# Patient Record
Sex: MALE | Race: BLACK OR AFRICAN AMERICAN | NOT HISPANIC OR LATINO | Employment: OTHER | URBAN - METROPOLITAN AREA
[De-identification: names, ages, dates, MRNs, and addresses within clinical notes are randomized per-mention and may not be internally consistent; named-entity substitution may affect disease eponyms.]

---

## 2017-06-19 ENCOUNTER — TELEPHONE (OUTPATIENT)
Dept: TRANSPLANT | Facility: CLINIC | Age: 72
End: 2017-06-19

## 2017-06-26 DIAGNOSIS — Z76.82 ORGAN TRANSPLANT CANDIDATE: Primary | ICD-10-CM

## 2017-07-12 ENCOUNTER — CLINICAL SUPPORT (OUTPATIENT)
Dept: INFECTIOUS DISEASES | Facility: CLINIC | Age: 72
End: 2017-07-12
Payer: MEDICARE

## 2017-07-12 ENCOUNTER — HOSPITAL ENCOUNTER (OUTPATIENT)
Dept: RADIOLOGY | Facility: HOSPITAL | Age: 72
Discharge: HOME | End: 2017-07-12
Attending: NURSE PRACTITIONER
Payer: MEDICARE

## 2017-07-12 ENCOUNTER — OFFICE VISIT (OUTPATIENT)
Dept: CARDIOLOGY | Facility: CLINIC | Age: 72
End: 2017-07-12
Payer: MEDICARE

## 2017-07-12 ENCOUNTER — LAB VISIT (OUTPATIENT)
Dept: LAB | Facility: HOSPITAL | Age: 72
End: 2017-07-12
Payer: MEDICARE

## 2017-07-12 ENCOUNTER — OFFICE VISIT (OUTPATIENT)
Dept: TRANSPLANT | Facility: CLINIC | Age: 72
End: 2017-07-12
Payer: MEDICARE

## 2017-07-12 VITALS
HEIGHT: 69 IN | SYSTOLIC BLOOD PRESSURE: 159 MMHG | BODY MASS INDEX: 26.38 KG/M2 | HEART RATE: 89 BPM | DIASTOLIC BLOOD PRESSURE: 69 MMHG | WEIGHT: 178.13 LBS

## 2017-07-12 VITALS
BODY MASS INDEX: 26.73 KG/M2 | RESPIRATION RATE: 17 BRPM | SYSTOLIC BLOOD PRESSURE: 146 MMHG | HEIGHT: 68 IN | DIASTOLIC BLOOD PRESSURE: 68 MMHG | HEART RATE: 100 BPM | WEIGHT: 176.38 LBS | TEMPERATURE: 98 F | OXYGEN SATURATION: 98 %

## 2017-07-12 DIAGNOSIS — E78.5 HYPERLIPIDEMIA, UNSPECIFIED HYPERLIPIDEMIA TYPE: ICD-10-CM

## 2017-07-12 DIAGNOSIS — I10 ESSENTIAL HYPERTENSION: ICD-10-CM

## 2017-07-12 DIAGNOSIS — C61 PROSTATE CANCER: ICD-10-CM

## 2017-07-12 DIAGNOSIS — Z76.82 ORGAN TRANSPLANT CANDIDATE: ICD-10-CM

## 2017-07-12 DIAGNOSIS — Z01.818 PRE-TRANSPLANT EVALUATION FOR CHRONIC KIDNEY DISEASE: Primary | ICD-10-CM

## 2017-07-12 DIAGNOSIS — I12.0 HYPERTENSIVE NEPHROSCLEROSIS, STAGE 5 CHRONIC KIDNEY DISEASE OR END STAGE RENAL DISEASE: ICD-10-CM

## 2017-07-12 DIAGNOSIS — Z01.810 PREOPERATIVE CARDIOVASCULAR EXAMINATION: Primary | ICD-10-CM

## 2017-07-12 DIAGNOSIS — E87.20 ACIDOSIS: ICD-10-CM

## 2017-07-12 DIAGNOSIS — N18.4 CKD (CHRONIC KIDNEY DISEASE) STAGE 4, GFR 15-29 ML/MIN: ICD-10-CM

## 2017-07-12 DIAGNOSIS — Z76.82 PRE-KIDNEY TRANSPLANT, PATIENT ON TRANSPLANT LIST: ICD-10-CM

## 2017-07-12 DIAGNOSIS — Z76.82 PRE-KIDNEY TRANSPLANT, PATIENT ON TRANSPLANT LIST: Primary | ICD-10-CM

## 2017-07-12 PROBLEM — I12.9 HYPERTENSIVE NEPHROSCLEROSIS: Status: ACTIVE | Noted: 2017-07-12

## 2017-07-12 LAB
A1 AG RBC QL: NORMAL
ABO + RH BLD: NORMAL
ALBUMIN SERPL BCP-MCNC: 3.8 G/DL
ALP SERPL-CCNC: 96 U/L
ALT SERPL W/O P-5'-P-CCNC: 11 U/L
ANION GAP SERPL CALC-SCNC: 8 MMOL/L
APTT BLDCRRT: 21.4 SEC
AST SERPL-CCNC: 15 U/L
BASOPHILS # BLD AUTO: 0.01 K/UL
BASOPHILS NFR BLD: 0.2 %
BILIRUB DIRECT SERPL-MCNC: 0.2 MG/DL
BILIRUB SERPL-MCNC: 0.5 MG/DL
BLD GP AB SCN CELLS X3 SERPL QL: NORMAL
BUN SERPL-MCNC: 36 MG/DL
CALCIUM SERPL-MCNC: 9.3 MG/DL
CHLORIDE SERPL-SCNC: 113 MMOL/L
CHOLEST/HDLC SERPL: 6.8 {RATIO}
CO2 SERPL-SCNC: 22 MMOL/L
COMPLEXED PSA SERPL-MCNC: 0.03 NG/ML
CREAT SERPL-MCNC: 3.8 MG/DL
DIFFERENTIAL METHOD: ABNORMAL
EOSINOPHIL # BLD AUTO: 0.2 K/UL
EOSINOPHIL NFR BLD: 2.9 %
ERYTHROCYTE [DISTWIDTH] IN BLOOD BY AUTOMATED COUNT: 14.5 %
EST. GFR  (AFRICAN AMERICAN): 17.2 ML/MIN/1.73 M^2
EST. GFR  (NON AFRICAN AMERICAN): 14.9 ML/MIN/1.73 M^2
GLUCOSE SERPL-MCNC: 112 MG/DL
HBV CORE AB SERPL QL IA: NEGATIVE
HBV SURFACE AG SERPL QL IA: NEGATIVE
HCT VFR BLD AUTO: 32.6 %
HCV AB SERPL QL IA: NEGATIVE
HDL/CHOLESTEROL RATIO: 14.7 %
HDLC SERPL-MCNC: 259 MG/DL
HDLC SERPL-MCNC: 38 MG/DL
HGB BLD-MCNC: 10.4 G/DL
HIV 1+2 AB+HIV1 P24 AG SERPL QL IA: NEGATIVE
INR PPP: 1
LDLC SERPL CALC-MCNC: 192.4 MG/DL
LYMPHOCYTES # BLD AUTO: 2.6 K/UL
LYMPHOCYTES NFR BLD: 42.8 %
MCH RBC QN AUTO: 26.6 PG
MCHC RBC AUTO-ENTMCNC: 31.9 %
MCV RBC AUTO: 83 FL
MONOCYTES # BLD AUTO: 0.4 K/UL
MONOCYTES NFR BLD: 7 %
NEUTROPHILS # BLD AUTO: 2.8 K/UL
NEUTROPHILS NFR BLD: 46.9 %
NONHDLC SERPL-MCNC: 221 MG/DL
PHOSPHATE SERPL-MCNC: 3 MG/DL
PLATELET # BLD AUTO: 213 K/UL
PMV BLD AUTO: 10.7 FL
POTASSIUM SERPL-SCNC: 4.9 MMOL/L
PROT SERPL-MCNC: 7.4 G/DL
PROTHROMBIN TIME: 10.4 SEC
RBC # BLD AUTO: 3.91 M/UL
RPR SER QL: NORMAL
SODIUM SERPL-SCNC: 143 MMOL/L
TRIGL SERPL-MCNC: 143 MG/DL
WBC # BLD AUTO: 5.96 K/UL

## 2017-07-12 PROCEDURE — 99999 PR PBB SHADOW E&M-EST. PATIENT-LVL III: CPT | Mod: PBBFAC,TXP,, | Performed by: INTERNAL MEDICINE

## 2017-07-12 PROCEDURE — 90472 IMMUNIZATION ADMIN EACH ADD: CPT | Mod: PBBFAC,TXP

## 2017-07-12 PROCEDURE — 90472 IMMUNIZATION ADMIN EACH ADD: CPT | Mod: PBBFAC

## 2017-07-12 PROCEDURE — G0009 ADMIN PNEUMOCOCCAL VACCINE: HCPCS | Mod: PBBFAC,TXP

## 2017-07-12 PROCEDURE — 1159F MED LIST DOCD IN RCRD: CPT | Mod: TXP,,, | Performed by: INTERNAL MEDICINE

## 2017-07-12 PROCEDURE — 81373 HLA I TYPING 1 LOCUS LR: CPT | Mod: 91,PO,TXP

## 2017-07-12 PROCEDURE — 86825 HLA X-MATH NON-CYTOTOXIC: CPT | Mod: PO,TXP

## 2017-07-12 PROCEDURE — 76770 US EXAM ABDO BACK WALL COMP: CPT | Mod: 26,GC,TXP, | Performed by: RADIOLOGY

## 2017-07-12 PROCEDURE — 90670 PCV13 VACCINE IM: CPT | Mod: PBBFAC,TXP

## 2017-07-12 PROCEDURE — 1126F AMNT PAIN NOTED NONE PRSNT: CPT | Mod: TXP,,, | Performed by: INTERNAL MEDICINE

## 2017-07-12 PROCEDURE — 72170 X-RAY EXAM OF PELVIS: CPT | Mod: TC,TXP

## 2017-07-12 PROCEDURE — 93010 ELECTROCARDIOGRAM REPORT: CPT | Mod: S$PBB,TXP,, | Performed by: INTERNAL MEDICINE

## 2017-07-12 PROCEDURE — 71020 XR CHEST PA AND LATERAL: CPT | Mod: TC,TXP

## 2017-07-12 PROCEDURE — 81376 HLA II TYPING 1 LOCUS LR: CPT | Mod: PO,TXP

## 2017-07-12 PROCEDURE — 97802 MEDICAL NUTRITION INDIV IN: CPT | Mod: PBBFAC,TXP | Performed by: DIETITIAN, REGISTERED

## 2017-07-12 PROCEDURE — 76770 US EXAM ABDO BACK WALL COMP: CPT | Mod: TC,TXP

## 2017-07-12 PROCEDURE — 71020 XR CHEST PA AND LATERAL: CPT | Mod: 26,TXP,, | Performed by: RADIOLOGY

## 2017-07-12 PROCEDURE — 86829 HLA CLASS I/II ANTIBODY QUAL: CPT | Mod: PO,TXP

## 2017-07-12 PROCEDURE — 81373 HLA I TYPING 1 LOCUS LR: CPT | Mod: PO,TXP

## 2017-07-12 PROCEDURE — 99214 OFFICE O/P EST MOD 30 MIN: CPT | Mod: PBBFAC,25,TXP | Performed by: INTERNAL MEDICINE

## 2017-07-12 PROCEDURE — 99205 OFFICE O/P NEW HI 60 MIN: CPT | Mod: S$PBB,TXP,, | Performed by: INTERNAL MEDICINE

## 2017-07-12 PROCEDURE — 99204 OFFICE O/P NEW MOD 45 MIN: CPT | Mod: S$PBB,TXP,, | Performed by: PHYSICIAN ASSISTANT

## 2017-07-12 PROCEDURE — 81376 HLA II TYPING 1 LOCUS LR: CPT | Mod: 91,PO,TXP

## 2017-07-12 PROCEDURE — 93005 ELECTROCARDIOGRAM TRACING: CPT | Mod: PBBFAC,TXP | Performed by: INTERNAL MEDICINE

## 2017-07-12 PROCEDURE — 72170 X-RAY EXAM OF PELVIS: CPT | Mod: 26,TXP,, | Performed by: RADIOLOGY

## 2017-07-12 PROCEDURE — 99999 PR PBB SHADOW E&M-EST. PATIENT-LVL I: CPT | Mod: PBBFAC,TXP,,

## 2017-07-12 PROCEDURE — 99999 PR PBB SHADOW E&M-EST. PATIENT-LVL IV: CPT | Mod: PBBFAC,TXP,, | Performed by: INTERNAL MEDICINE

## 2017-07-12 PROCEDURE — 90636 HEP A/HEP B VACC ADULT IM: CPT | Mod: PBBFAC,TXP

## 2017-07-12 PROCEDURE — 99204 OFFICE O/P NEW MOD 45 MIN: CPT | Mod: S$PBB,TXP,, | Performed by: TRANSPLANT SURGERY

## 2017-07-12 PROCEDURE — 86825 HLA X-MATH NON-CYTOTOXIC: CPT | Mod: 91,PO,TXP

## 2017-07-12 PROCEDURE — 86828 HLA CLASS I&II ANTIBODY QUAL: CPT | Mod: 91,PO,TXP

## 2017-07-12 PROCEDURE — 99204 OFFICE O/P NEW MOD 45 MIN: CPT | Mod: S$PBB,TXP,, | Performed by: INTERNAL MEDICINE

## 2017-07-12 RX ORDER — AMLODIPINE BESYLATE 10 MG/1
10 TABLET ORAL DAILY
Refills: 0 | Status: ON HOLD | COMMUNITY
Start: 2017-04-14 | End: 2019-04-26 | Stop reason: HOSPADM

## 2017-07-12 RX ORDER — SODIUM BICARBONATE 650 MG/1
1300 TABLET ORAL 3 TIMES DAILY
Refills: 0 | COMMUNITY
Start: 2017-06-01

## 2017-07-12 RX ORDER — DOXAZOSIN 4 MG/1
2 TABLET ORAL DAILY
Refills: 0 | Status: ON HOLD | COMMUNITY
Start: 2017-06-28 | End: 2019-06-14

## 2017-07-12 RX ORDER — SIMVASTATIN 40 MG/1
40 TABLET, FILM COATED ORAL NIGHTLY
Status: ON HOLD | COMMUNITY
End: 2019-04-26 | Stop reason: HOSPADM

## 2017-07-12 RX ORDER — GEMFIBROZIL 600 MG/1
600 TABLET, FILM COATED ORAL
Status: ON HOLD | COMMUNITY
End: 2019-06-14

## 2017-07-12 NOTE — LETTER
July 12, 2017      David Zambrano MD  [address]  [facility] [address]           [facility]  [address]  [facility] [address]  Phone: [phone]          Patient: Flakito Smart   MR Number: [id number]   YOB: 1945   Date of Visit: 7/12/2017       Dear Dr. Daivd Zambrano:    Thank you for referring Flakito Smart to me for evaluation. Attached you will find relevant portions of my assessment and plan of care.    If you have questions, please do not hesitate to call me. I look forward to following Flakito Smart along with you.    Sincerely,    Mihai Starr MD    Enclosure  CC:  No Recipients    If you would like to receive this communication electronically, please contact [email or web address] or [phone] to request more information on [employer] access.    For providers and/or their staff who would like to refer a patient to [facility], please contact us through our one-stop-shop provider referral line, [facility], at [phone].    If you feel you have received this communication in error or would no longer like to receive these types of communications, please e-mail [email or web address]

## 2017-07-12 NOTE — PROGRESS NOTES
Transplant Nephrology  Kidney Transplant Recipient Evaluation    Referring Physician: Dorian Mccartney  Current Nephrologist: Dorian Mccartney    Subjective:   CC:  Initial evaluation of kidney transplant candidacy.    HPI:  Mr. Smart is a 71 y.o. year old Black or  male who has presented to be evaluated as a potential kidney transplant recipient.  He has advanced kidney disease secondary to HTN.  Patient is currently pre-dialysis. He has a no dialysis access for dialysis access.     Patient has CKD stage 4 due biopsy proven hypertensive nephrosclerosis (as per records). No DM no h/o CAD PVD, Strokes, No infections. He is predialysis. He goes fishing once a week. No cane walker or 02 at home. Able to walk up to 6 blocks. Denied any chest pain or SOB. Quit smoking in 1970. Currently retired. No recent admissions to the hospital. Lives in the [facility] and Dr. JERICA Mccartney is his nephrologist.     No living donors but will speak with his friends and family members.    Great  Appetite. He lives with the [facility]. Patient does his own cooking and cleaning. Very well groomed. He is is here with her neighbor and his sister (who is not here will be the care giver)          Previous Transplant: no    Past Medical and Surgical History: Mr. Smart  has a past medical history of Acidosis; CKD (chronic kidney disease) stage 4, GFR 15-29 ml/min; Essential hypertension; Hypertensive nephrosclerosis (biopsy proven as per records); and Prostate cancer diagnised in 1994.  He has a past surgical history that includes Renal biopsy and ostatectomy (1994).    Past Social and Family History: Mr. Smart reports that he quit smoking about 47 years ago. He started smoking about 55 years ago. He has a 16.00 pack-year smoking history. He has never used smokeless tobacco. He reports that he does not drink alcohol or use drugs. His family history includes Blindness in his brother; Cancer in his mother; Heart disease in his  "father and mother; Kidney disease in his sister.    Review of Systems     Skin: no skin rash  CNS; no headaches, blurred vision, seizure, or syncope  ENT: No JVD,  Adenopathies,  nasal congestion. No oral lesions  Cardiac: No chest pain, dyspnea, claudication, edema or palpitations  Respiratory: No SOB, cough, hemoptysis   Gastro-intestinal: No diarrhea, constipation, abdominal pain, nausea, vomit. No ascitis  Genitourinary: no hematuria, dysuria, frequency, frequency  Musculoskeletal: joint pain, arthritis or vasculitic changes  Psych: alert awake, oriented, No cranial nerves deficit.      Objective:   Blood pressure (!) 146/68, pulse 100, temperature 97.9 °F (36.6 °C), temperature source Oral, resp. rate 17, height 5' 7.91" (1.725 m), weight 80 kg (176 lb 5.9 oz), SpO2 98 %.body mass index is 26.89 kg/m².    Physical Exam     Low frailty index    Very acceptable functional capacity    Head: normocephalic  Neck: No JVD, cervical axillary, or femoral adenopathies  Heart: no murmurs, Normal s1 and s2, No gallops, no rubs, No murmurs  Lungs; CTA, good respiratory effort, no crackles  Abdomen: soft, non tender, no splenomegaly or hepatomegaly, no massess, no bruits  Extremities: No edema, skin rash, joint pain  SNC: awake, alert oriented. Cranial nerves are intact, no focalized, sensitivity and strength preserved      Labs:  Lab Results   Component Value Date    WBC 5.96 07/12/2017    HGB 10.4 (L) 07/12/2017    HCT 32.6 (L) 07/12/2017     07/12/2017    K 4.9 07/12/2017     (H) 07/12/2017    CO2 22 (L) 07/12/2017    BUN 36 (H) 07/12/2017    CREATININE 3.8 (H) 07/12/2017    EGFRNONAA 14.9 (A) 07/12/2017    CALCIUM 9.3 07/12/2017    PHOS 3.0 07/12/2017    ALBUMIN 3.8 07/12/2017    AST 15 07/12/2017    ALT 11 07/12/2017       No results found for: PREALBUMIN, BILIRUBINUA, GGT, AMYLASE, LIPASE, PROTEINUA, NITRITE, RBCUA, WBCUA    No results found for: HLAABCTYPE    Labs were reviewed with the " patient.    Assessment:     1. Organ transplant candidate    2. Pre-transplant evaluation for chronic kidney disease    3. CKD (chronic kidney disease) stage 4, GFR 15-29 ml/min    4. Essential hypertension    5. Prostate cancer diagnised in 1994    6. Acidosis    7. Hypertensive nephrosclerosis, stage 5 chronic kidney disease or end stage renal disease        Plan:     Transplant Candidacy:   Based on available information, Mr. Smart is a high-risk kidney transplant candidate.  Final determination of transplant candidacy will be made once workup is complete and reviewed by the selection committee.      I advised living donation specially due to his age. Despite his age He does not have any evidence of c/v disease, no claudication good functional capacity. He is acceptable candidate however I explained that his expected waiting time could be a barrier for kidney transplantation and this will be discussed with the transplant team during our selection meeting.     I explained the nature of the waiting list and allocation system in the . Side effects from IS medications and increased potential for leucopenia, infection and readmissions in elderly patient    I encourage patient to remain very active, life style changes follow up with his nephrologist    Education provided    All questions answered      David Zambrano MD       [facility] Patient Status  Functional Status: 60% - Requires occasional assistance but is able to care for needs  Physical Capacity: No Limitations

## 2017-07-12 NOTE — Clinical Note
Thank you for referring Flakito Smart for preoperative cardiovascular evaluation. Please see my note for details of this encounter. If you have any questions, please contact me.  Thank you again for the referral.

## 2017-07-12 NOTE — PROGRESS NOTES
Transplant Surgery  Kidney Transplant Recipient Evaluation    Referring Physician: Dorian Mccartney  Current Nephrologist: Dorian Mccartney    Subjective:     Reason for Visit: evaluate transplant candidacy    History of Present Illness: Flakito Smart is a 71 y.o. year old male undergoing transplant evaluation.    Dialysis History: Flakito is pre-dialysis.      Transplant History: N/A    Etiology of Renal Disease: Hypertensive Nephrosclerosis (based on medical records from referral).    Review of Systems   Constitutional: Negative for activity change and appetite change.   HENT: Negative for congestion and tinnitus.    Eyes: Negative for redness and visual disturbance.   Respiratory: Negative for cough and shortness of breath.    Cardiovascular: Negative for chest pain and palpitations.   Gastrointestinal: Negative for abdominal distention and abdominal pain.   Endocrine: Negative for polydipsia and polyuria.   Genitourinary: Negative for decreased urine volume and dysuria.   Musculoskeletal: Negative for arthralgias and back pain.   Skin: Negative for pallor and rash.   Allergic/Immunologic: Negative for environmental allergies and immunocompromised state.   Neurological: Negative for tremors and headaches.   Hematological: Negative for adenopathy. Does not bruise/bleed easily.   Psychiatric/Behavioral: Negative for behavioral problems and confusion.       Objective:     Physical Exam:  Constitutional:   Vitals reviewed: yes   Well-nourished and well-groomed: yes  Eyes:   Sclerae icteric: no   Extraocular movements intact: yes  GI:    Bowel sounds normal: yes   Tenderness: no    If yes, quadrant/location: not applicable   Palpable masses: no    If yes, quadrant/location: not applicable   Hepatosplenomegaly: no   Ascites: no   Hernia: no    If yes, type/location: not applicable   Surgical scars: yes    If yes, type/location: laparoscopic port sites from prostatectomy  Resp:   Effort normal: yes   Breath sounds  normal: yes    CV:   Regular rate and rhythm: yes   Heart sounds normal: yes   Femoral pulses normal: yes   Extremities edematous: no  Skin:   Rashes or lesions present: no    If yes, describe:not applicable   Jaundice:: no    Musculoskeletal:   Gait normal: yes   Strength normal: yes  Psych:   Oriented to person, place, and time: yes   Affect and mood normal: yes    Additional comments: not applicable    Counseling: We provided Flakito Smart with a group education session today.  We discussed kidney transplantation at length with him, including risks, potential complications, and alternatives in the management of his renal failure.  The discussion included complications related to anesthesia, bleeding, infection, primary nonfunction, and ATN.  I discussed the typical postoperative course, length of hospitalization, the need for long-term immunosuppression, and the need for long-term routine follow-up.  I discussed living-donor and -donor transplantation and the relative advantages and disadvantages of each.  I also discussed average waiting times for both living donation and  donation.  I discussed national and center-specific survival rates.  I also mentioned the potential benefit of multicenter listing to candidates listed with centers within more than one organ procurement organization.  All questions were answered.    Final determination of transplant candidacy will be made once evaluation is complete and reviewed by the Kidney & Kidney/Pancreas Selection Committee.         Transplant Surgery - Candidacy   Assessment/Plan:   Flakito Smart is pre-dialysis with CKD stage 4 (GFR 15-29 mL/min). I see no surgical contraindication to placing a kidney transplant. Based on available information, Flakito Smart is a suitable kidney transplant candidate. I have concerns with his age as a candidate for transplant. I think it would be reasonable for living donor kidney transplant, but I am not  confident that the benefits would outweigh the risks for cadaveric donor in the future.    Rodney Medley MD

## 2017-07-12 NOTE — PROGRESS NOTES
"TRANSPLANT NUTRITIONAL ASSESSMENT    Referring Provider: David Zambrano MD    Reason for Visit: Pre-kidney transplant work-up (pre-dialysis)    Age: 71 y.o.  Sex: male    Patient Active Problem List   Diagnosis    Organ transplant candidate    Pre-transplant evaluation for chronic kidney disease    CKD (chronic kidney disease) stage 4, GFR 15-29 ml/min    Essential hypertension    Prostate cancer diagnised in 1994    Acidosis    Hypertensive nephrosclerosis (biopsy proven as per records)     Past Medical History:   Diagnosis Date    Acidosis 7/12/2017    CKD (chronic kidney disease) stage 4, GFR 15-29 ml/min 7/12/2017    Essential hypertension 7/12/2017    Hypertensive nephrosclerosis (biopsy proven as per records) 7/12/2017    Prostate cancer diagnised in 1994 7/12/2017     Lab Results   Component Value Date     (H) 07/12/2017    K 4.9 07/12/2017    PHOS 3.0 07/12/2017    CHOL 259 (H) 07/12/2017    HDL 38 (L) 07/12/2017    TRIG 143 07/12/2017    ALBUMIN 3.8 07/12/2017    CALCIUM 9.3 07/12/2017       Current Outpatient Prescriptions   Medication Sig    amlodipine (NORVASC) 10 MG tablet Take 10 mg by mouth once daily at 6am.    doxazosin (CARDURA) 4 MG tablet Take 2 mg by mouth once daily at 6am.    gemfibrozil (LOPID) 600 MG tablet Take 600 mg by mouth 2 (two) times daily before meals.    simvastatin (ZOCOR) 40 MG tablet Take 40 mg by mouth every evening.    sodium bicarbonate 650 MG tablet Take 650 mg by mouth 3 (three) times daily.     No current facility-administered medications for this visit.      Allergies: Review of patient's allergies indicates no known allergies.    Ht Readings from Last 1 Encounters:   07/12/17 5' 7.91" (1.725 m)     Wt Readings from Last 1 Encounters:   07/12/17 80 kg (176 lb 5.9 oz)      BMI: Body mass index is 26.89 kg/m².    Weight Change/Time: no significant wt change reported  Current Diet: general diet-no restrictions  Appetite/Current Intake: good "   Exercise/Physical Activity: sedentary per pt  Nutritional/Herbal Supplements: none reported  Chewing/Swallowing Problems: none reported  Symptoms: none    Estimated Kcal Need: 2000kcals/day (25kcals/kg BW)  Estimated Protein Need: 64gms protein/day (0.8gms/kg BW)    Nutritional History: Pt does own meal preparation and grocery shopping.  Dining out/fast food: 2 times a week at [employer] or local restaurants.  Does not use much salt in cooking.  Pt typically consumes 3 meals a day, snacks on oatmeal cookies and whole milk between meals, beverages include milk, Sprite, sodas, and sometimes water.      Educational Need? yes  Barriers: none identified  Discussed with: patient and caregiver  Interventions: Patient taught nutrition information regarding   -Renal Nutrition Therapy packet reviewed ( high/low food sources of K, Phos and protein, low sodium and fluid intake, emphasis on moderate protein intake)   -Encouraged pt to limit intake of high Na foods in diet, high fat foods in diet-discussed healthier options.  -Encouraged pt to start regular physical activity  Goals/Recommendations: diet adherence and aerobic exercises as medically able  Actions Taken: instruct/provide written information  Patient and/or family comprehend instructions: yes  Outcome: Verbalizes understanding  Monitoring: contact information provided, will follow-up as needed      Counseling Time: 15 minutes

## 2017-07-12 NOTE — PROGRESS NOTES
Subjective:   Patient ID:  Flakito Smart is a 71 y.o. male who presents for evaluation of No chief complaint on file.      HPI: Patient with hypertension, hyperlipidemia, CKD-4 presents for preoperative cardiovascular evaluation for renal transplantation.    Patient denies any chest discomfort on exertion or at rest.  Patient denies any dyspnea at rest or on exertion, orthopnea, PND, or edema.  Patient denies any palpitations, lightheadedness, or syncope. Patient denies lower extremity claudication.     Cardiac risk factors: male older than 45 years of age, hyperlipidemia, hypertension, tobacco use (stop 25 years ago), family history    Past Medical History:   Diagnosis Date    Acidosis 7/12/2017    CKD (chronic kidney disease) stage 4, GFR 15-29 ml/min 7/12/2017    Essential hypertension 7/12/2017    Hypertensive nephrosclerosis (biopsy proven as per records) 7/12/2017    Prostate cancer diagnised in 1994 7/12/2017       Past Surgical History:   Procedure Laterality Date    PROSTATECTOMY  1994    associated with prostate cancer     RENAL BIOPSY         Social History   Substance Use Topics    Smoking status: Former Smoker     Packs/day: 2.00     Years: 8.00     Start date: 7/12/1962     Quit date: 7/12/1970    Smokeless tobacco: Never Used    Alcohol use No      Comment: quit drinking 1 yr ago       Outpatient Medications Prior to Visit   Medication Sig Dispense Refill    amlodipine (NORVASC) 10 MG tablet Take 10 mg by mouth once daily at 6am.  0    doxazosin (CARDURA) 4 MG tablet Take 2 mg by mouth once daily at 6am.  0    gemfibrozil (LOPID) 600 MG tablet Take 600 mg by mouth 2 (two) times daily before meals.      simvastatin (ZOCOR) 40 MG tablet Take 40 mg by mouth every evening.      sodium bicarbonate 650 MG tablet Take 650 mg by mouth 3 (three) times daily.  0     No facility-administered medications prior to visit.        Review of patient's allergies indicates:  No Known  "Allergies    Review of Systems   Constitution: Negative for weight gain and weight loss.   HENT: Negative for nosebleeds.    Eyes: Negative for vision loss in left eye and vision loss in right eye.   Cardiovascular: Negative for claudication.        As above   Respiratory: Negative for hemoptysis, shortness of breath, snoring, sputum production and wheezing.    Endocrine: Negative for polydipsia and polyuria.   Hematologic/Lymphatic: Does not bruise/bleed easily.   Musculoskeletal: Negative for myalgias.   Gastrointestinal: Negative for change in bowel habit, hematemesis, hematochezia, melena, nausea and vomiting.   Genitourinary: Negative for hematuria.   Neurological: Negative for focal weakness and numbness.     Objective:   Physical Exam   Constitutional: He is oriented to person, place, and time. He appears well-developed and well-nourished.   BP (!) 159/69 (BP Location: Right arm, Patient Position: Sitting, BP Method: Automatic)   Pulse 89   Ht 5' 9" (1.753 m)   Wt 80.8 kg (178 lb 2.1 oz)   BMI 26.31 kg/m²    Eyes: Pupils are equal, round, and reactive to light.   Neck: Neck supple. No JVD present. Carotid bruit is not present. No thyromegaly present.   Cardiovascular: Normal rate, regular rhythm and intact distal pulses.   No extrasystoles are present. PMI is not displaced.  Exam reveals gallop and S4. Exam reveals no friction rub.    Murmur heard.   Systolic murmur is present  1-2 SHANTANU at apex without radiation  Pulmonary/Chest: Effort normal and breath sounds normal. He has no wheezes. He has no rales.   Abdominal: Soft. Bowel sounds are normal. There is no hepatosplenomegaly. There is no tenderness.   Musculoskeletal: He exhibits no edema.   Neurological: He is alert and oriented to person, place, and time. He has normal strength.   Skin: No cyanosis. Nails show no clubbing.       Lab Results   Component Value Date    WBC 5.96 07/12/2017    HGB 10.4 (L) 07/12/2017    HCT 32.6 (L) 07/12/2017    MCV 83 " 07/12/2017     07/12/2017       Lab Results   Component Value Date     07/12/2017    K 4.9 07/12/2017    BUN 36 (H) 07/12/2017    CREATININE 3.8 (H) 07/12/2017     (H) 07/12/2017    CHOL 259 (H) 07/12/2017    HDL 38 (L) 07/12/2017    LDLCALC 192.4 (H) 07/12/2017    TRIG 143 07/12/2017    CHOLHDL 14.7 (L) 07/12/2017    HGB 10.4 (L) 07/12/2017    HCT 32.6 (L) 07/12/2017     07/12/2017    INR 1.0 07/12/2017     10 year ASCVD risk - 33.3% (9.2% with good risk factor control)    ECG showed sinus rhythm with PACs and was otherwise a normal ECG.   Assessment:     1. Preoperative cardiovascular examination    2. Hyperlipidemia, unspecified hyperlipidemia type    3. Essential hypertension    4. CKD (chronic kidney disease) stage 4, GFR 15-29 ml/min      Patient has no symptoms of cardiac ischemia, heart failure, or significant arrhythmias. His ASCVD risk is markedly elevated.  He is not clear what, if any, medications he is taking for cholesterol.  Both his cholesterol and BP need better control to reduce his long term risk.  Suggest the following for cholesterol control:    D/C both simvastatin and gemfibrozil (gemfibrozil should not be used with a statin)  Start rosuvastatin at 5 mg qd then increase to 10 mg, if needed  Start fenofibrate 48 mg qd   Recheck lipid profile in 3 weeks    Better BP control - consider thiazide diuretic as patient still makes urine.    The cholesterol can be managed by the primary care physician and BP can be managed by PCP and nephrology.  However, I would be glad to see again, if desired.  Otherwise, I will see him again as needed.    As far as surgical risk, his estimated risk for an adverse cardiac outcome (myocardial infarction, pulmonary edema, ventricular fibrillation, cardiac arrest, or complete heart block) with the proposed surgery is low (assuming he does not have coronary artery disease) (Revised Cardiac Risk Index [RCRI] - Devyn Criteria - 0.9%). Will  reassess after seeing results of stress echo.  Plan:     Diagnoses and all orders for this visit:    Preoperative cardiovascular examination    Hyperlipidemia, unspecified hyperlipidemia type    Essential hypertension    CKD (chronic kidney disease) stage 4, GFR 15-29 ml/min

## 2017-07-12 NOTE — PROGRESS NOTES
PHARM.D. PRE-TRANSPLANT NOTE:    This patient's medication therapy was evaluated as part of his pre-transplant evaluation.    The following pharmacologic concerns were noted: none      Current Outpatient Prescriptions   Medication Sig Dispense Refill    amlodipine (NORVASC) 10 MG tablet Take 10 mg by mouth once daily at 6am.  0    doxazosin (CARDURA) 4 MG tablet Take 2 mg by mouth once daily at 6am.  0    gemfibrozil (LOPID) 600 MG tablet Take 600 mg by mouth 2 (two) times daily before meals.      simvastatin (ZOCOR) 40 MG tablet Take 40 mg by mouth every evening.      sodium bicarbonate 650 MG tablet Take 650 mg by mouth 3 (three) times daily.  0     No current facility-administered medications for this visit.          I am available for consultation and can be contacted, as needed by the other members of the Kidney Transplant team.

## 2017-07-12 NOTE — LETTER
July 14, 2017        Dorian Mccartney  [address]  [address]  [place] [address]  Phone: [phone]  Fax: [phone]             [address]  [address]  [facility] [address]  Phone: [phone]   Patient: Flakito Smart   MR Number: [id number]   YOB: 1945   Date of Visit: 7/12/2017       Dear Dr. Dorian Mccartney    Thank you for referring Flakito Smart to me for evaluation. Attached you will find relevant portions of my assessment and plan of care.    If you have questions, please do not hesitate to call me. I look forward to following Flakito Smart along with you.    Sincerely,    David Zambrano MD    Enclosure    If you would like to receive this communication electronically, please contact [email or web address] or [phone] to request [employer] access.    [employer] is a tool which provides read-only access to select patient information with whom you have a relationship. Its easy to use and provides real time access to review your patients record including encounter summaries, notes, results, and demographic information.    If you feel you have received this communication in error or would no longer like to receive these types of communications, please e-mail [email or web address]

## 2017-07-12 NOTE — PROGRESS NOTES
INITIAL PATIENT EDUCATION NOTE    Mr. Flakito Smart was seen in pre-kidney transplant clinic for evaluation for kidney, kidney/pancreas or pancreas only transplant.  The patient attended a group education session that discussed/reviewed the following aspects of transplantation: evaluation and selection committee process, UNOS waitlist management/multiple listings, types of organs offered (KDPI < 85%, KDPI > 85%, PHS increased risk, DCD), financial aspects, surgical procedures, dietary instruction pre- and post-transplant, health maintenance pre- and post-transplant, post-transplant hospitalization and outpatient follow-up, potential to participate in a research protocol, and medication management and side effects.  A question and answer session was provided after the presentation.    The patient was seen by all members of the multi-disciplinary team to include: Nephrologist/PA, Surgeon, , Transplant Coordinator, , Pharmacist and Dietician (if applicable).    The patient reviewed and signed all consents for evaluation which were witnessed and sent to scanning into the EPIC chart.    The patient was given an education book and plan for further evaluation based on his individual assessment.      The patient was encouraged to call with any questions or concerns.

## 2017-07-12 NOTE — PROGRESS NOTES
Pre Transplant Infectious Diseases Consult  Kidney Transplant Recipient Evaluation    Requesting Physician: David Zambrano MD    Reason for Visit:    Chief Complaint   Patient presents with    Kidney Transplant Evaluation     History of Present Illness  Flakito Smart is a 71 y.o. year old  male with advanced Kidney disease secondary to HTN currently being evaluated for Kidney transplant.  Patient is currently pre-dialysis. He has a LUE AVF for dialysis access. Patient denies any recent fever, chills, or infective illnesses.      1) Do you have a history of:   YES NO   Diabetes      [] [x]     Diabetic Foot Infection/Bone Infection  []        [x]     Surgical Removal of Spleen   []  [x]                  2) Have you had recurrent infections involving:             YES NO  Sinus infections  []         [x]   Sore Throat   []         [x]                 Prostate Infections  []         [x]              Bladder Infections  []         [x]                     Kidney Infections  []         [x]                               Intestinal Infections  []         [x]      Skin Infections   []         [x]       Reproductive Infections          []  [x]   Periodontal Disease  []         [x]        3)Have you ever had: YES     NO UNKNOWN      Chicken Pox   []         [x]  []   Shingles   []         [x]  []   Orolabial Herpes             []  [x]  []   Genital Herpes  []         [x]  []   Cytomegalovirus  []         [x]  []   Rosalia-Barr Virus  []         [x]  []   Genital Warts   []         [x]  []   Hepatitis A   []         [x]  []   Hepatitis B   []         [x]  []   Hepatitis C   []         [x]  []   Syphilis   []         [x]  []   Gonorrhea   []         [x]  []   Pelvic Inflammatory   Disease   []         [x]  []   Chlamydia Infection  []         [x]  []   Intestinal parasites   or worms   []         [x]  []   Fungal Infections  []         [x]  []   Blood Infections  []         [x]  []        Comment:       4) Have  you ever been exposed   YES NO  To someone with tuberculosis?  [x]   []   If yes, what treatment did you receive:    - Dad had Tuberculosis and was treated. Patient reports history of negative PPD skin tests. He has never been treated for latent Tb.    5) What states have you lived in? [place]    6) What countries have you visited for more than 2 weeks? N/A                         YES NO  7) Did you have any associated infections?  []  [x]       8) Are you planning to travel outside the    []  [x]   [place] after your transplant?    9) Household                   YES NO  Do you have pets living in your house?    []         [x]   If yes, describe:     Do you spend time or live on a farm or     []         [x]   have livestock or other farm animals?  If yes, which ones:    Do you have a fish tank?          []  [x]       Do you have a litter box?      []         [x]     Do you fish or hunt?      [x]         []     Do you clean or skin fish or animals?    [x]         []     Do you consume raw or undercooked    []         [x]   meat, fish, or shellfish?      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    []         [x]   13)Do you hike, camp, or spend     time in wooded areas?   []         [x]        14) The patient's immunization history was reviewed.    Have you ever received:  YES NO UNKNOWN DATES   Routine Childhood vaccines  [x]         []  []      Influenza vaccine   [x]  []  [] 2017   Pneumovax    []  [x]  []     Tetanus-diptheria   [x]         []  []  >10 years ago   Hepatitis A vaccine series       []  [x]  []    Hepatitis B vaccine series         []  [x]  []    Meningitis vaccine   []         [x]  []     Varicella vaccine   []         [x]  []        Based on the patients immunization history and serologies, immunizations were ordered:         Ordered  Not  Ordered  Influenza Vaccine     []    [x]   Hepatitis A series at 0,  6 months   [x]    []   Hepatitis B seriesat 0, 1, and 6 months  [x]    []   Hepatitis B High Dose 0,1, and 6 months  []    [x]   Prevnar      [x]    []   Pneumovax      []    [x]    TDap       [x]    []    Zoster       []    [x]   Menactra      []    [x]            The patient was encouraged to contact us about any problems that may develop after immunization and possible side effects were reviewed.      Previous Transplant: no    Etiology of Kidney Disease: HTN    Allergies: Review of patient's allergies indicates no known allergies.    There is no immunization history on file for this patient.  Past Medical History:   Diagnosis Date    Acidosis 7/12/2017    CKD (chronic kidney disease) stage 4, GFR 15-29 ml/min 7/12/2017    Essential hypertension 7/12/2017    Hypertensive nephrosclerosis (biopsy proven as per records) 7/12/2017    Prostate cancer diagnised in 1994 7/12/2017     Past Surgical History:   Procedure Laterality Date    PROSTATECTOMY  1994    associated with prostate cancer     RENAL BIOPSY        Social History     Social History    Marital status:      Spouse name: N/A    Number of children: N/A    Years of education: N/A     Occupational History          retired     Social History Main Topics    Smoking status: Former Smoker     Packs/day: 2.00     Years: 8.00     Start date: 7/12/1962     Quit date: 7/12/1970    Smokeless tobacco: Never Used    Alcohol use No      Comment: quit drinking 1 yr ago    Drug use: No    Sexual activity: Not on file      Comment: single     Other Topics Concern    Not on file     Social History Narrative    No narrative on file       Review of Systems   Constitution: Negative for chills, decreased appetite, fever, weakness, malaise/fatigue, night sweats, weight gain and weight loss.   HENT: Positive for hearing loss. Negative for congestion, ear pain, headaches, hoarse voice, sore  throat and tinnitus.    Eyes: Negative for blurred vision, redness and visual disturbance.   Cardiovascular: Negative for chest pain, leg swelling and palpitations.   Respiratory: Negative for cough, hemoptysis, shortness of breath, sputum production and wheezing.    Endocrine: Negative for cold intolerance and heat intolerance.   Hematologic/Lymphatic: Negative for adenopathy. Does not bruise/bleed easily.   Skin: Negative for dry skin, itching, rash and suspicious lesions.   Musculoskeletal: Negative for back pain, joint pain, myalgias and neck pain.   Gastrointestinal: Negative for abdominal pain, constipation, diarrhea, heartburn, nausea and vomiting.   Genitourinary: Negative for dysuria, flank pain, frequency, hematuria, hesitancy and urgency.   Neurological: Negative for dizziness, numbness and paresthesias.   Psychiatric/Behavioral: Positive for memory loss. Negative for depression. The patient does not have insomnia and is not nervous/anxious.    Allergic/Immunologic: Negative for environmental allergies, HIV exposure, hives and persistent infections.     Physical Exam   Constitutional: He is oriented to person, place, and time. He appears well-developed and well-nourished.   HENT:   Head: Normocephalic and atraumatic.   Mouth/Throat: Uvula is midline, oropharynx is clear and moist and mucous membranes are normal. He does not have dentures. Normal dentition. No dental abscesses or dental caries.   Eyes: Conjunctivae and lids are normal. Pupils are equal, round, and reactive to light. No scleral icterus.   Neck: Neck supple.   Cardiovascular: Normal rate and regular rhythm.  Exam reveals no friction rub.    Murmur heard.  Pulmonary/Chest: Effort normal and breath sounds normal. No respiratory distress. He has no decreased breath sounds. He has no wheezes. He has no rhonchi. He has no rales.   Abdominal: Soft. Normal appearance, normal aorta and bowel sounds are normal. He exhibits no distension. There is no  hepatosplenomegaly. There is no tenderness.   Musculoskeletal: He exhibits no edema.   LUE AVF c/d/i. Good thrill.   Lymphadenopathy:        Head (right side): No submental, no submandibular and no occipital adenopathy present.        Head (left side): No submental, no submandibular and no occipital adenopathy present.     He has no cervical adenopathy.     He has no axillary adenopathy.   Neurological: He is alert and oriented to person, place, and time. No cranial nerve deficit.   Skin: Skin is warm, dry and intact. No lesion and no rash noted. He is not diaphoretic. No erythema. No pallor.   Psychiatric: He has a normal mood and affect. His behavior is normal.     Diagnostics: No results found for: RPR  No results found for: CMVANTIBODIE  No results found for: HIV1X2  No results found for: HTLVIIIANTIB  Hepatitis B Surface Ag   Date Value Ref Range Status   07/12/2017 Negative  Final     Hep B Core Total Ab   Date Value Ref Range Status   07/12/2017 Negative  Final     Hepatitis C Ab   Date Value Ref Range Status   07/12/2017 Negative  Final     No results found for: TOXOIGG  No components found for: TOXOIGGINTER  No results found for: FWL0PJI  No results found for: QPF4UVC  No results found for: VARICELLAZOS  No results found for: VARICELLAINT  No results found for: STRONGANTIGG  No results found for: EPSTEINBARRV  No results found for: HEPBSAB  No results found for: QUANTIFERON  No results found for: HEPAIGM  No results found for: PPD  No results found for this or any previous visit.         Transplant Infectious Diseases - Candidacy    Assessment/Plan:     Transplant Candidacy: Based on available information, there are no identified significant barriers to transplantation from an infectious disease standpoint pending acceptable serologies.  Quantiferon gold, HIV, RPR and Strongyloides IgG are pending. If positive, please refer to ID clinic.    Immunizations needed:  1. Hepatitis A/Hepatitis B combination  vaccine today, in 1 month and 6 months. Rx given for second and third dose.  2. Prevnar 13 today followed by Pneumovax in 8 weeks  3. Tdap today      Final determination of transplant candidacy will be made once evaluation is complete and reviewed by the Transplant Selection Committee.    Jojo Quiles PA-C         Counseling:I discussed with [facility] the risk for increased susceptibility to infections following transplantation including increased risk for infection right after transplant and if rejection should occur.  The patients has been counseled on the importance of vaccinations including but not limited to a yearly flu vaccine.  Specific guidance has been provided to the patient regarding the patients occupation, hobbies and activities to avoid future infectious complications including but not limited to avoiding undercooked meats and seafood, proper hygiene, and contact with animals.

## 2017-07-13 LAB
HEP. B SURF AB, QUAL: NEGATIVE
HEP. B SURF AB, QUANT.: <3 MIU/ML
VARICELLA INTERPRETATION: POSITIVE
VARICELLA ZOSTER IGG: 3.95 ISR

## 2017-07-13 NOTE — PROGRESS NOTES
Transplant Recipient Adult Psychosocial Assessment    [facility]  [place] [address]    Telephone Information:   Mobile [phone]   Home  [phone] (home)  Work  There is no work phone number on file.  E-mail  [email or web address]    Sex: male  YOB: 1945  Age: 71 y.o.    Encounter Date: 2017  [place] Citizen: yes  Primary Language: English   Needed: no    Emergency Contact:  Name: Rodney Huber  Relationship: son  Address: same as above  Phone Numbers:  [phone] (home), n/a (work), [phone] (mobile)    Family/Social Support:   Number of dependents/: none  Marital history:  once and  in .  Two stepsons:  Rodney Huber and Kenney Estevez.  Other family dynamics: Parents are .  Two sisters Allegra and Elaine, and one older brother.  Pt states he is closest to sister, Elaine Back, age 70 who does not drive.      Household Composition:    Pt and Rodney osorio age 44 live in pt's home.  Rodney works as a hopper for local waste company and has been living with pt since his release from second incarceration 2 yrs ago.  Rodney drives.  Pt states he will not be part of his caregiving plan.      Do you and your caregivers have access to reliable transportation? yes  PRIMARY CAREGIVER: UNDETERMINED AT THIS TIME.  POSSIBLY NEIGHBOR, GRAHAM ALVAREZ who accompanied patient to today's visit will be primary caregiver, phone number [phone].      provided in-depth information to patient and caregiver regarding pre- and post-transplant caregiver role.   strongly encourages patient and caregiver to have concrete plan regarding post-transplant care giving, including back-up caregiver(s) to ensure care giving needs are met as needed.    Patient and Caregiver states understanding all aspects of caregiver role/commitment.    Patient and Caregiver verbalizes understanding of the education provided today and  caregiver responsibilities.         remains available. Patient and Caregiver agree to contact  in a timely manner if concerns arise.      Able to take time off work without financial concerns: Pt's neighbor works full time but stated she will probably be retired before pt is caled in for transplant.     Additional Significant Others who will Assist with Transplant:  Name: Danish Hassan [phone]  Age: 59  City: [place] State: [place]  Relationship: neighbor  Does person drive? yes    Name: Elaine Back and Allegra (Elaine's dtr and pt's niece) [phone]  Age: 70   City: [place] State: [place]  Relationship: sister  Does person drive? no     Name: Lucy Jeter [phone]  Age: 31  City: [place] State: [place]  Relationship: grndtr  Does person drive? yes      Living Will: no  Healthcare Power of : no  Advance Directives on file: <<no information> per medical record.  Verbally reviewed LW/HCPA information.   provided patient with copy of LW/HCPA documents and provided education on completion of forms.    Living Donors: No. Education and resource information given to patient.    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    Spouse/Significant Other Employment: Shae works for school board (a ) and Elaine is retired    Disabled: no    Monthly Income:  FDC: $1400.00  Able to afford all costs now and if transplanted, including medications: yes  Patient verbalizes understanding of personal responsibilities related to transplant costs and the importance of having a financial plan to ensure that patients transplant costs are fully  covered.       provided fundraising information/education. Patientverbalizes understanding.   remains available.    Insurance:   Payor/Plan Subscr  Sex Relation Sub. Ins. ID Effective Group Num   1. MEDICARE - MEIDRIS LOPEZ 1945 Male  [id number]                                    [address]   2. BLUE CROSS BLIDRIS LOPEZ 1945 Male  [id number]                                   [address] [address]       Primary Insurance (for UNOS reporting): Public Insurance - Medicare FFS (Fee For Service)  Secondary Insurance (for UNOS reporting): Private Insurance  Patient verbalizes clear understanding that patient may experience difficulty obtaining and/or be denied insurance coverage post-surgery. This includes and is not limited to disability insurance, life insurance, health insurance, burial insurance, long term care insurance, and other insurances.      Patient also reports understanding that future health concerns related to or unrelated to transplantation may not be covered by patient's insurance.  Resources and information provided and reviewed.     Patient provides verbal permission to release any necessary information to outside resources for patient care and discharge planning.  Resources and information provided are reviewed.      Dialysis Adherence: Patient reports he is predialysis.  He stated he is compliant with all MD recommendations.  SW has sent a letter to pt's nephrologist and awaits answer.    Infusion Service: patient utilizing? no  Home Health: patient utilizing? no  DME: no  Pulmonary/Cardiac Rehab: none   ADLS:  Pt states ability to independently accomplish all adls.    Adherence:   Pt states current and expected compliance with all healthcare recommendations.  Adherence education and counseling provided.     Per History Section:Past Medical History:   Diagnosis Date    Acidosis 2017    CKD (chronic kidney disease) stage 4, GFR  15-29 ml/min 7/12/2017    Essential hypertension 7/12/2017    Hypertensive nephrosclerosis (biopsy proven as per records) 7/12/2017    Prostate cancer diagnised in 1994 7/12/2017     Social History   Substance Use Topics    Smoking status: Former Smoker     Packs/day: 2.00     Years: 8.00     Start date: 7/12/1962     Quit date: 7/12/1970    Smokeless tobacco: Never Used    Alcohol use No      Comment: quit drinking 1 yr ago     History   Drug Use No     History   Sexual Activity    Sexual activity: Not on file     Comment: single       Per Today's Psychosocial:  Tobacco: Pt reports he smoked one pack every two weeks and quit 30 yrs ago..  Alcohol: Pt drank one six pack per week and quit drinking beer upon kidney dialysis..  Illicit Drugs/Non-prescribed Medications: none, patient denies any use.    Patient states clear understanding of the potential impact of substance use as it relates to transplant candidacy and is aware of possible random substance screening.  Substance abstinence/cessation counseling, education and resources provided and reviewed.     Arrests/DWI/Treatment/Rehab: patient denies    Psychiatric History:    Mental Health: pt denies mental health concerns  Psychiatrist/Counselor: none  Medications:  none  Suicide/Homicide Issues: Pt denies current or past suicidal/homicidal ideation.   Safety at home: Pt denies any home safety concerns.      Knowledge: Patient states having clear understanding and realistic expectations regarding the potential risks and potential benefits of organ transplantation and organ donation and agrees to discuss with health care team members and support system members, as well as to utilize available resources and express questions and/or concerns in order to further facilitate the pt informed decision-making.  Resources and information provided and reviewed.    Patient is aware of [facility]'s affiliation and/or partnership with agencies in home health care, LTAC, SNF,  [facility], and other hospitals and clinics.    Understanding: Patient reports having a clear understanding of the many lifetime commitments involved with being a transplant recipient, including costs, compliance, medications, lab work, procedures, appointments, concrete and financial planning, preparedness, timely and appropriate communication of concerns, abstinence (ETOH, tobacco, illicit non-prescribed drugs), adherence to all health care team recommendations, support system and caregiver involvement, appropriate and timely resource utilization and follow-through, mental health counseling as needed/recommended, and patient and caregiver responsibilities.  Social Service Handbook, resources and detailed educational information provided and reviewed.  Educational information provided.    Patient also reports current and expected compliance with health care regime and states having a clear understanding of the importance of compliance.      Patient reports a clear understanding that risks and benefits may be involved with organ transplantation and with organ donation.       Patient also reports clear understanding that psychosocial risk factors may affect patient, and include but are not limited to feelings of depression, generalized anxiety, anxiety regarding dependence on others, post traumatic stress disorder, feelings of guilt and other emotional and/or mental concerns, and/or exacerbation of existing mental health concerns.  Detailed resources provided and discussed.      Patient agrees to access appropriate resources in a timely manner as needed and/or as recommended, and to communicate concerns appropriately.  Patient also reports a clear understanding of treatment options available.     Patient received education in a group setting.   reviewed education, provided additional information, and answered questions.    Feelings or Concerns: Pt and neighbor deny any concerns at this time.    Coping: Pt  states he peter by fishing with friend every other weekend, walks, sometimes runs, visits with friends and bbqs, and watches TV.    Goals: Pt would like to avoid dialysis and continue to enjoy [facility].  Patient referred to Vocational Rehabilitation.    Interview Behavior: Patient and Caregiver presents as alert and oriented x 4, pleasant, good eye contact, well groomed, recall good, concentration/judgement good, average intelligence, calm, communicative, cooperative and asking and answering questions appropriately. Pt was accompanied by his neighbor, Shae who presented as supportive of pt's pursuit of transplant.        Transplant Social Work - Candidacy  Assessment/Plan:     Psychosocial Suitability: Patient presents as a marginal candidate for kidney transplant at this time. Based on psychosocial risk factors, patient presents as high risk, due to lack of solid caregiver plan.  Should pt return to clinic with committed caregivers and plan for his care post transplant, pt will be suitable and low risk.  Pt has adequate insurance, stated compliance with MD orders and son who lives with him.      Recommendations/Additional Comments: SW recommended that pt speak with family and friends about caregiver plan and once a plan is made return to clinic for nurse teaching and sw visit.      Sabiha Way LCSW

## 2017-07-14 LAB
CMV IGG SERPL QL IA: REACTIVE
MITOGEN NIL: 5.34 IU/ML
NIL: 0.03 IU/ML
STRONGYLOIDES ANTIBODY IGG: NEGATIVE
TB ANTIGEN NIL: 4.5 IU/ML
TB ANTIGEN: 4.53 IU/ML
TB GOLD: POSITIVE

## 2017-07-17 ENCOUNTER — TELEPHONE (OUTPATIENT)
Dept: TRANSPLANT | Facility: CLINIC | Age: 72
End: 2017-07-17

## 2017-07-17 DIAGNOSIS — Z76.82 ORGAN TRANSPLANT CANDIDATE: Primary | ICD-10-CM

## 2017-07-17 NOTE — TELEPHONE ENCOUNTER
----- Message from Hoang De Leon Jr., MD sent at 7/17/2017 10:28 AM CDT -----  Need noncon CT pelvis  ----- Message -----  From: Joellen Daly  Sent: 7/17/2017   8:24 AM  To: MD Dr. Moises Mercedes Jr.,    Please review pelvic x-ray on this round sam patient from 7/12/17 and advise if ok for kidney transplant. Thanks.

## 2017-07-18 ENCOUNTER — TELEPHONE (OUTPATIENT)
Dept: TRANSPLANT | Facility: CLINIC | Age: 72
End: 2017-07-18

## 2017-07-18 DIAGNOSIS — Z12.11 SCREEN FOR COLON CANCER: Primary | ICD-10-CM

## 2017-07-19 LAB
B CELL RESULTS - XM AUTO: NEGATIVE
B MCS AVERAGE - XM AUTO: 5
FXMAU TESTING DATE: NORMAL
HLA AB QL: NEGATIVE
HLA AB SERPL: NEGATIVE
HLATY INTERPRETATION: NORMAL
SCRFL TESTING DATE: NORMAL
SERUM COLLECTION DT - XM AUTO: NORMAL
SERUM COLLECTION DT: NORMAL
T CELL RESULTS - XM AUTO: NEGATIVE
T MCS AVERAGE - XM AUTO: -6.5

## 2017-07-25 DIAGNOSIS — Z12.11 SPECIAL SCREENING FOR MALIGNANT NEOPLASMS, COLON: Primary | ICD-10-CM

## 2017-07-25 RX ORDER — POLYETHYLENE GLYCOL 3350, SODIUM SULFATE ANHYDROUS, SODIUM BICARBONATE, SODIUM CHLORIDE, POTASSIUM CHLORIDE 236; 22.74; 6.74; 5.86; 2.97 G/4L; G/4L; G/4L; G/4L; G/4L
4 POWDER, FOR SOLUTION ORAL ONCE
Qty: 4000 ML | Refills: 0 | Status: SHIPPED | OUTPATIENT
Start: 2017-07-25 | End: 2017-07-25

## 2017-07-26 LAB
HLA DRB4 1: NORMAL
HLA SSO DNA TYPING CLASS I & II INTERPRETATION: NORMAL
HLA-A 1 SERO. EQUIV: 3
HLA-A 1: NORMAL
HLA-A 2 SERO. EQUIV: 30
HLA-A 2: NORMAL
HLA-B 1 SERO. EQUIV: 53
HLA-B 1: NORMAL
HLA-B 2 SERO. EQUIV: 81
HLA-B 2: NORMAL
HLA-BW 1 SERO. EQUIV: 4
HLA-BW 2 SERO. EQUIV: 6
HLA-C 1: NORMAL
HLA-C 2: NORMAL
HLA-CW 1 SERO. EQUIV: 4
HLA-CW 2 SERO. EQUIV: 18
HLA-DQ 1 SERO. EQUIV: 7
HLA-DQ 2 SERO. EQUIV: 6
HLA-DQB1 1: NORMAL
HLA-DQB1 2: NORMAL
HLA-DRB1 1 SERO. EQUIV: 8
HLA-DRB1 1: NORMAL
HLA-DRB1 2 SERO. EQUIV: 13
HLA-DRB1 2: NORMAL
HLA-DRB3 1: NORMAL
HLA-DRB3 2: NORMAL
HLA-DRB345 1 SERO. EQUIV: 52
HLA-DRB345 2 SERO. EQUIV: NORMAL
HLA-DRB4 2: NORMAL
HLA-DRB5 1: NORMAL
HLA-DRB5 2: NORMAL
SSDQB TESTING DATE: NORMAL
SSDRB TESTING DATE: NORMAL
SSOA TESTING DATE: NORMAL
SSOB TESTING DATE: NORMAL
SSOC TESTING DATE: NORMAL
SSODR TESTING DATE: NORMAL
TYSSO TESTING DATE: NORMAL

## 2017-07-31 ENCOUNTER — HOSPITAL ENCOUNTER (OUTPATIENT)
Dept: RADIOLOGY | Facility: HOSPITAL | Age: 72
Discharge: HOME | End: 2017-07-31
Attending: TRANSPLANT SURGERY
Payer: MEDICARE

## 2017-07-31 ENCOUNTER — HOSPITAL ENCOUNTER (OUTPATIENT)
Dept: CARDIOLOGY | Facility: CLINIC | Age: 72
Discharge: HOME | End: 2017-07-31
Payer: MEDICARE

## 2017-07-31 DIAGNOSIS — I35.2 NONRHEUMATIC AORTIC VALVE STENOSIS WITH INSUFFICIENCY: ICD-10-CM

## 2017-07-31 DIAGNOSIS — Z76.82 ORGAN TRANSPLANT CANDIDATE: ICD-10-CM

## 2017-07-31 LAB
DIASTOLIC DYSFUNCTION: NO
ESTIMATED PA SYSTOLIC PRESSURE: 36.97
RETIRED EF AND QEF - SEE NOTES: 65 (ref 55–65)
TRICUSPID VALVE REGURGITATION: NORMAL

## 2017-07-31 PROCEDURE — 93325 DOPPLER ECHO COLOR FLOW MAPG: CPT | Mod: PBBFAC,TXP | Performed by: INTERNAL MEDICINE

## 2017-07-31 PROCEDURE — 72192 CT PELVIS W/O DYE: CPT | Mod: 26,GC,TXP, | Performed by: RADIOLOGY

## 2017-07-31 PROCEDURE — 93320 DOPPLER ECHO COMPLETE: CPT | Mod: 26,S$PBB,TXP, | Performed by: INTERNAL MEDICINE

## 2017-07-31 PROCEDURE — 93351 STRESS TTE COMPLETE: CPT | Mod: 26,S$PBB,TXP, | Performed by: INTERNAL MEDICINE

## 2017-07-31 PROCEDURE — 72192 CT PELVIS W/O DYE: CPT | Mod: TC,TXP

## 2017-08-02 ENCOUNTER — ANESTHESIA (OUTPATIENT)
Dept: ENDOSCOPY | Facility: HOSPITAL | Age: 72
End: 2017-08-02
Payer: MEDICARE

## 2017-08-02 ENCOUNTER — ANESTHESIA EVENT (OUTPATIENT)
Dept: ENDOSCOPY | Facility: HOSPITAL | Age: 72
End: 2017-08-02
Payer: MEDICARE

## 2017-08-02 ENCOUNTER — TELEPHONE (OUTPATIENT)
Dept: TRANSPLANT | Facility: CLINIC | Age: 72
End: 2017-08-02

## 2017-08-02 ENCOUNTER — HOSPITAL ENCOUNTER (OUTPATIENT)
Facility: HOSPITAL | Age: 72
Discharge: HOME | End: 2017-08-02
Attending: COLON & RECTAL SURGERY | Admitting: COLON & RECTAL SURGERY
Payer: MEDICARE

## 2017-08-02 VITALS
OXYGEN SATURATION: 99 % | BODY MASS INDEX: 25.18 KG/M2 | SYSTOLIC BLOOD PRESSURE: 138 MMHG | HEIGHT: 69 IN | RESPIRATION RATE: 16 BRPM | DIASTOLIC BLOOD PRESSURE: 72 MMHG | WEIGHT: 170 LBS | HEART RATE: 75 BPM | TEMPERATURE: 98 F

## 2017-08-02 DIAGNOSIS — Z12.11 SCREENING FOR COLON CANCER: ICD-10-CM

## 2017-08-02 PROCEDURE — 37000009 HC ANESTHESIA EA ADD 15 MINS: Mod: TXP | Performed by: COLON & RECTAL SURGERY

## 2017-08-02 PROCEDURE — 88305 TISSUE EXAM BY PATHOLOGIST: CPT | Mod: 59,TXP

## 2017-08-02 PROCEDURE — 88305 TISSUE EXAM BY PATHOLOGIST: CPT | Mod: 26,TXP,,

## 2017-08-02 PROCEDURE — 45385 COLONOSCOPY W/LESION REMOVAL: CPT | Mod: TXP,,, | Performed by: COLON & RECTAL SURGERY

## 2017-08-02 PROCEDURE — 45380 COLONOSCOPY AND BIOPSY: CPT | Mod: 59,TXP,, | Performed by: COLON & RECTAL SURGERY

## 2017-08-02 PROCEDURE — 27201012 HC FORCEPS, HOT/COLD, DISP: Mod: TXP | Performed by: COLON & RECTAL SURGERY

## 2017-08-02 PROCEDURE — 45380 COLONOSCOPY AND BIOPSY: CPT | Mod: TXP | Performed by: COLON & RECTAL SURGERY

## 2017-08-02 PROCEDURE — 27201089 HC SNARE, DISP (ANY): Mod: TXP | Performed by: COLON & RECTAL SURGERY

## 2017-08-02 PROCEDURE — 25000003 PHARM REV CODE 250: Mod: TXP | Performed by: NURSE PRACTITIONER

## 2017-08-02 PROCEDURE — 63600175 PHARM REV CODE 636 W HCPCS: Mod: TXP | Performed by: NURSE ANESTHETIST, CERTIFIED REGISTERED

## 2017-08-02 PROCEDURE — D9220A PRA ANESTHESIA: Mod: 33,TXP,, | Performed by: ANESTHESIOLOGY

## 2017-08-02 PROCEDURE — 45385 COLONOSCOPY W/LESION REMOVAL: CPT | Mod: TXP | Performed by: COLON & RECTAL SURGERY

## 2017-08-02 PROCEDURE — 37000008 HC ANESTHESIA 1ST 15 MINUTES: Mod: TXP | Performed by: COLON & RECTAL SURGERY

## 2017-08-02 RX ORDER — SODIUM CHLORIDE 9 MG/ML
INJECTION, SOLUTION INTRAVENOUS CONTINUOUS
Status: DISCONTINUED | OUTPATIENT
Start: 2017-08-02 | End: 2017-08-02 | Stop reason: HOSPADM

## 2017-08-02 RX ORDER — PROPOFOL 10 MG/ML
VIAL (ML) INTRAVENOUS CONTINUOUS PRN
Status: DISCONTINUED | OUTPATIENT
Start: 2017-08-02 | End: 2017-08-02

## 2017-08-02 RX ORDER — PROPOFOL 10 MG/ML
VIAL (ML) INTRAVENOUS
Status: DISCONTINUED | OUTPATIENT
Start: 2017-08-02 | End: 2017-08-02

## 2017-08-02 RX ORDER — LIDOCAINE HCL/PF 100 MG/5ML
SYRINGE (ML) INTRAVENOUS
Status: DISCONTINUED | OUTPATIENT
Start: 2017-08-02 | End: 2017-08-02

## 2017-08-02 RX ORDER — PHENYLEPHRINE HYDROCHLORIDE 10 MG/ML
INJECTION INTRAVENOUS
Status: DISCONTINUED | OUTPATIENT
Start: 2017-08-02 | End: 2017-08-02

## 2017-08-02 RX ADMIN — PHENYLEPHRINE HYDROCHLORIDE 200 MCG: 10 INJECTION INTRAVENOUS at 11:08

## 2017-08-02 RX ADMIN — PHENYLEPHRINE HYDROCHLORIDE 400 MCG: 10 INJECTION INTRAVENOUS at 11:08

## 2017-08-02 RX ADMIN — PROPOFOL 100 MG: 10 INJECTION, EMULSION INTRAVENOUS at 11:08

## 2017-08-02 RX ADMIN — SODIUM CHLORIDE: 0.9 INJECTION, SOLUTION INTRAVENOUS at 10:08

## 2017-08-02 RX ADMIN — PROPOFOL 100 MCG/KG/MIN: 10 INJECTION, EMULSION INTRAVENOUS at 11:08

## 2017-08-02 RX ADMIN — LIDOCAINE HYDROCHLORIDE 100 MG: 20 INJECTION, SOLUTION INTRAVENOUS at 11:08

## 2017-08-02 NOTE — TRANSFER OF CARE
"Anesthesia Transfer of Care Note    Patient: Flakito Smart    Procedure(s) Performed: Procedure(s) (LRB):  COLONOSCOPY (N/A)    Patient location: PACU    Anesthesia Type: general    Transport from OR: Transported from OR on room air with adequate spontaneous ventilation    Post pain: adequate analgesia    Post assessment: no apparent anesthetic complications    Post vital signs: stable    Level of consciousness: awake    Nausea/Vomiting: no nausea/vomiting    Complications: none    Transfer of care protocol was followed      Last vitals:   Visit Vitals  BP (!) 190/88   Pulse 93   Temp 36.7 °C (98 °F)   Resp 16   Ht 5' 9" (1.753 m)   Wt 77.1 kg (170 lb)   SpO2 99%   BMI 25.10 kg/m²     "

## 2017-08-02 NOTE — PATIENT INSTRUCTIONS
Discharge Summary/Instructions for after Colonoscopy with   Biopsy/Polypectomy  Patient Name: Flakito Smart  Patient MRN: [id number]  Patient YOB: 1945  Wednesday, August 02, 2017    Brad Wall MD  RESTRICTIONS ON ACTIVITY:  - Do not drive a car or operate machinery until the day after the procedure.      - The following day: return to full activity including work.  - Diet: Eat and drink normally unless instructed otherwise.  TREATMENT FOR COMMON SIDE EFFECTS:  - Mild abdominal pain and bloating or excessive gas: walk, eat lightly, and   use a heating pad.  SYMPTOMS TO WATCH FOR AND REPORT TO YOUR PHYSICIAN:  1. Severe abdominal pain.  2. Fever within 24 hours after a procedure.  3. A large amount of rectal bleeding. (A small amount of blood from the   rectum is not serious, especially if hemorrhoids are present.  3.  Because air was put into your colon during the procedure, expelling   large amounts of air from your rectum is normal.  4.  You may not have a bowel movement for 1-3 days because of the   colonoscopy prep.  This is normal.  5.  Call you Doctor or go directly to the emergency room if you notice any   of the following:   Chills and/or fever over 101   Persistent vomiting   Severe abdominal pain, other than gas cramps   Severe chest pain   Black, tarry stools   Any bleeding - exceeding one cup  RESULTS:  - You will be notified in 7 - 10 days of your pathology results and your   Doctor's recommendations.  Your doctor recommends these additional instructions:  Your physician has recommended a repeat colonoscopy in three years for   surveillance.   Return to my office.   You are being discharged to home.   Resume your regular diet indefinitely.   Resume your previous diet indefinitely.   Continue your present medications.  If you have any questions or problems, please call your physician.  COLON AND RECTAL SURGERY OFFICE:  [phone]  EMERGENCY PHONE NUMBER: [phone]  Brad  MD [facility]  8/2/2017 11:44:38 AM  This report has been verified and signed electronically.

## 2017-08-02 NOTE — TELEPHONE ENCOUNTER
----- Message from Hoang De Leon Jr., MD sent at 8/2/2017 10:02 AM CDT -----  CT scan is favorable for transplant.       DS  ----- Message -----  From: Joellen Daly  Sent: 8/1/2017   8:16 AM  To: MD Dr. Moises Mercedes Jr., please review films. Patient is 71, pre-dialysis, no identified living donors at this time. Deemed high risk but functional by the team. Thank you.

## 2017-08-02 NOTE — ANESTHESIA PREPROCEDURE EVALUATION
08/02/2017  Flakito Smart is a 71 y.o., male.    Anesthesia Evaluation    I have reviewed the Patient Summary Reports.    I have reviewed the Nursing Notes.   I have reviewed the Medications.     Review of Systems  Anesthesia Hx:  No problems with previous Anesthesia    Social:  Non-Smoker    Hematology/Oncology:  Hematology Normal   Oncology Normal     EENT/Dental:EENT/Dental Normal   Cardiovascular:   Hypertension, well controlled    Pulmonary:  Pulmonary Normal    Renal/:   Chronic Renal Disease, CRI    Hepatic/GI:  Hepatic/GI Normal    Musculoskeletal:  Musculoskeletal Normal    Neurological:  Neurology Normal    Endocrine:  Endocrine Normal    Dermatological:  Skin Normal    Psych:  Psychiatric Normal           Physical Exam  General:  Well nourished    Airway/Jaw/Neck:  Airway Findings: Mouth Opening: Normal Tongue: Normal  General Airway Assessment: Adult  Mallampati: II  Improves to II with phonation.  TM Distance: Normal, at least 6 cm      Dental:  Dental Findings: In tact   Chest/Lungs:  Chest/Lungs Findings: Clear to auscultation     Heart/Vascular:  Heart Findings: Rate: Normal  Rhythm: Regular Rhythm  Sounds: Normal        Mental Status:  Mental Status Findings:  Cooperative, Alert and Oriented         Anesthesia Plan  Type of Anesthesia, risks & benefits discussed:  Anesthesia Type:  general, MAC  Patient's Preference:   Intra-op Monitoring Plan:   Intra-op Monitoring Plan Comments:   Post Op Pain Control Plan:   Post Op Pain Control Plan Comments:   Induction:   IV  Beta Blocker:  Patient is not currently on a Beta-Blocker (No further documentation required).       Informed Consent: Patient understands risks and agrees with Anesthesia plan.  Questions answered. Anesthesia consent signed with patient.  ASA Score: 3     Day of Surgery Review of History & Physical:            Ready For  Surgery From Anesthesia Perspective.

## 2017-08-02 NOTE — ANESTHESIA POSTPROCEDURE EVALUATION
"Anesthesia Post Evaluation    Patient: Flakito Smart    Procedure(s) Performed: Procedure(s) (LRB):  COLONOSCOPY (N/A)    Final Anesthesia Type: general  Patient location during evaluation: PACU  Patient participation: Yes- Able to Participate  Level of consciousness: awake and alert  Post-procedure vital signs: reviewed and stable  Pain management: adequate  Airway patency: patent  PONV status at discharge: No PONV  Anesthetic complications: no      Cardiovascular status: blood pressure returned to baseline  Respiratory status: unassisted  Hydration status: euvolemic  Follow-up not needed.        Visit Vitals  /72 (BP Location: Left arm, Patient Position: Lying, BP Method: Automatic)   Pulse 75   Temp 36.8 °C (98.2 °F) (Oral)   Resp 16   Ht 5' 9" (1.753 m)   Wt 77.1 kg (170 lb)   SpO2 99%   BMI 25.10 kg/m²       Pain/Renetta Score: Renetta Score: 9 (8/2/2017 12:00 PM)      "

## 2017-08-02 NOTE — H&P
Endoscopy H&P    Procedure : Colonoscopy      asymptomatic screening exam      Past Medical History:   Diagnosis Date    Acidosis 7/12/2017    CKD (chronic kidney disease) stage 4, GFR 15-29 ml/min 7/12/2017    Essential hypertension 7/12/2017    Hypertensive nephrosclerosis (biopsy proven as per records) 7/12/2017    Prostate cancer diagnised in 1994 7/12/2017       Family History   Problem Relation Age of Onset    Heart disease Mother     Cancer Mother      breast cancer    Heart disease Father     Hypertension Father     Kidney disease Sister      CKD Not on dialysis    Blindness Brother     Heart attack Neg Hx        Social History     Social History    Marital status:      Spouse name: N/A    Number of children: N/A    Years of education: N/A     Occupational History          retired     Social History Main Topics    Smoking status: Former Smoker     Packs/day: 2.00     Years: 8.00     Start date: 7/12/1962     Quit date: 7/12/1970    Smokeless tobacco: Never Used    Alcohol use No      Comment: quit drinking 1 yr ago    Drug use: No    Sexual activity: Not on file      Comment: single     Other Topics Concern    Not on file     Social History Narrative    No narrative on file       Review of Systems:  Respiratory ROS: no cough, shortness of breath, or wheezing  Cardiovascular ROS: no chest pain or dyspnea on exertion  Gastrointestinal ROS: no abdominal pain, change in bowel habits, or black or bloody stools  Musculoskeletal ROS: negative  Neurological ROS: no TIA or stroke symptoms        Physical Exam:  General: no distress  Head: normocephalic  Neck: supple, symmetrical, trachea midline  Lungs:  clear to auscultation bilaterally and normal respiratory effort  Heart: regular rate and rhythm, S1, S2 normal, no murmur, rub or gallop  Abdomen: soft, non-tender non-distented; bowel sounds normal; no masses,  no  organomegaly  Extremities: no cyanosis or edema, or clubbing       Deep Sedation: Mallampati Score II (hard and soft palate, upper portion of tonsils anduvula visible)    II    Assessment and Plan:  Proceed with Colonoscopy

## 2017-08-07 ENCOUNTER — TELEPHONE (OUTPATIENT)
Dept: HEPATOLOGY | Facility: CLINIC | Age: 72
End: 2017-08-07

## 2017-08-07 ENCOUNTER — OFFICE VISIT (OUTPATIENT)
Dept: INFECTIOUS DISEASES | Facility: CLINIC | Age: 72
End: 2017-08-07
Payer: MEDICARE

## 2017-08-07 VITALS
HEART RATE: 105 BPM | TEMPERATURE: 98 F | HEIGHT: 69 IN | SYSTOLIC BLOOD PRESSURE: 169 MMHG | BODY MASS INDEX: 26.84 KG/M2 | DIASTOLIC BLOOD PRESSURE: 72 MMHG | WEIGHT: 181.19 LBS

## 2017-08-07 DIAGNOSIS — Z01.818 PRE-TRANSPLANT EVALUATION FOR CHRONIC KIDNEY DISEASE: ICD-10-CM

## 2017-08-07 DIAGNOSIS — Z22.7 LATENT TUBERCULOSIS BY BLOOD TEST: Primary | ICD-10-CM

## 2017-08-07 PROCEDURE — 99203 OFFICE O/P NEW LOW 30 MIN: CPT | Mod: S$PBB,TXP,, | Performed by: INTERNAL MEDICINE

## 2017-08-07 PROCEDURE — 99999 PR PBB SHADOW E&M-EST. PATIENT-LVL III: CPT | Mod: PBBFAC,TXP,, | Performed by: INTERNAL MEDICINE

## 2017-08-07 PROCEDURE — 90636 HEP A/HEP B VACC ADULT IM: CPT | Mod: PBBFAC,TXP

## 2017-08-07 PROCEDURE — 1126F AMNT PAIN NOTED NONE PRSNT: CPT | Mod: TXP,,, | Performed by: INTERNAL MEDICINE

## 2017-08-07 PROCEDURE — 3008F BODY MASS INDEX DOCD: CPT | Mod: TXP,,, | Performed by: INTERNAL MEDICINE

## 2017-08-07 PROCEDURE — 1159F MED LIST DOCD IN RCRD: CPT | Mod: TXP,,, | Performed by: INTERNAL MEDICINE

## 2017-08-07 RX ORDER — LANOLIN ALCOHOL/MO/W.PET/CERES
50 CREAM (GRAM) TOPICAL DAILY
Qty: 90 TABLET | Refills: 3 | Status: SHIPPED | OUTPATIENT
Start: 2017-08-07 | End: 2018-08-07

## 2017-08-07 RX ORDER — ISONIAZID 300 MG/1
300 TABLET ORAL DAILY
Qty: 90 TABLET | Refills: 3 | Status: SHIPPED | OUTPATIENT
Start: 2017-08-07 | End: 2019-06-20

## 2017-08-07 NOTE — TELEPHONE ENCOUNTER
----- Message from Annalee Maddox MD sent at 8/7/2017 11:50 AM CDT -----  Colonic polyp c/w tubular adenoma- no colon cancer - please let patient know.

## 2017-08-07 NOTE — PROGRESS NOTES
Subjective:      Patient ID: Flakito Smart is a 71 y.o. male.    Chief Complaint:Kidney Transplant Evaluation      History of Present Illness    Pt is being seen for pre-kidney transplant evaluation. He has kidney failure secondary to diabetes mellitus.     Serology results are as follows:    Varicella zoster IgG                  +  Quantiferon gold                       +  Hepatitis B surface antigen      -  Hepatitis B surface antibody    -  Hepatitis B core antibody         -  Hepatitis C antibody                 -  HIV 1/2 antibody                       -  HTLV 1/2 antibody   -  Cytomegalovirus IgG ab +  RPR     -  Strongyloides IgG  -    His vaccinations for TdaP,   and prevnar are current.  He has received the first Twinrix on 8/7/17       Review of Systems   Constitution: Negative for chills, decreased appetite, fever, weakness, malaise/fatigue, night sweats, weight gain and weight loss.   HENT: Negative for congestion, ear pain, headaches, hearing loss, hoarse voice, sore throat and tinnitus.    Eyes: Negative for blurred vision, redness and visual disturbance.   Cardiovascular: Negative for chest pain, leg swelling and palpitations.   Respiratory: Negative for cough, hemoptysis, shortness of breath and sputum production.    Hematologic/Lymphatic: Negative for adenopathy. Does not bruise/bleed easily.   Skin: Negative for dry skin, itching, rash and suspicious lesions.   Musculoskeletal: Negative for back pain, joint pain, myalgias and neck pain.   Gastrointestinal: Negative for abdominal pain, constipation, diarrhea, heartburn, nausea and vomiting.   Genitourinary: Negative for dysuria, flank pain, frequency, hematuria, hesitancy and urgency.   Neurological: Negative for dizziness, numbness and paresthesias.   Psychiatric/Behavioral: Negative for depression and memory loss. The patient does not have insomnia and is not nervous/anxious.      Objective:   Physical Exam   Constitutional: He is oriented to  person, place, and time. He appears well-developed and well-nourished. No distress.   Neurological: He is alert and oriented to person, place, and time.   Skin: Skin is warm and dry.   Psychiatric: He has a normal mood and affect. His behavior is normal. Thought content normal.   Vitals reviewed.    Assessment:       1. 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 Pre-transplant evaluation for chronic kidney disease          Plan:        1.  mg daily and vitamin B 6 50mg daily for 12 mos.   2. Needs monthly ALT monitoring. He wants to do it in [place]   3. Complete havrix series of 3

## 2017-08-07 NOTE — TELEPHONE ENCOUNTER
Message from Dr Maddox;  colonic polyp c/w tubular adenoma- no colon cancer - please let patient know.    Patient called at home and message relayed from Dr Maddox. Patient verbalized understanding. Pt asked when he was told to come back in and repeat the Colonoscopy. The patient he was told to repeat it in 3 yrs. Voiced understanding. DB

## 2017-08-09 ENCOUNTER — TELEPHONE (OUTPATIENT)
Dept: ENDOSCOPY | Facility: HOSPITAL | Age: 72
End: 2017-08-09

## 2017-09-06 ENCOUNTER — TELEPHONE (OUTPATIENT)
Dept: TRANSPLANT | Facility: HOSPITAL | Age: 72
End: 2017-09-06

## 2017-09-06 NOTE — TELEPHONE ENCOUNTER
CK spoke with pt and friend/neighbor, Shae Hassan who has stated commitment to assume primary caregiving responsibilities for pt should he receive kidney transplant.  She stated she felt confident that she will be able to provide the care as explained during pt's workup in July (she attended all teaching and appts).  Shae and pt agreed that Shae should be listed as pt's emergency contact.  She can be easily reached by cell [phone] since son works in a noisy environment and may not be available to answer his phone  Both Shae and pt stated that hillarydtr Jennifer Corona will serve as backup caregiver.  [phone].     Sw informed caregiver that in addition to providing care at the time immediately preceding and following transplant, caregivers are expected to accompany pts to their transplant appointments while they are listed for transplant.  Shae verbalized understanding and agreement. She denied any questions are concerns.   Pt remains predialysis and states he has an appt with Dr Nguyen tomorrow.  CK has sent two letters to nephrologist and asked pt to request that MD respond to compliance check letter when he sees him.  Pt verbalized understanding and agreement.     In light of friendShae's verbalized commitment to caregiving, pt presents as acceptable, medium risk due to limited caregiver support.

## 2017-09-08 ENCOUNTER — COMMITTEE REVIEW (OUTPATIENT)
Dept: TRANSPLANT | Facility: CLINIC | Age: 72
End: 2017-09-08

## 2017-09-08 NOTE — COMMITTEE REVIEW
Native Organ Dx: Hypertensive Nephrosclerosis      SELECTION COMMITTEE NOTE    Flakito Smart was presented at selection committee on 9/8/2017.  Patient met selection criteria for kidney transplant related to CKD, Stage 4 due to    Hypertensive Nephrosclerosis.  No absolute contraindications to transplant at this time.  Patient will be placed on the cadaveric wait list pending final financial approval from insurance company.  Patient will return to clinic for routine appointment in 6 month(s). Patient does not meet criteria for High KDPI kidney offer due to weight.     I spoke very openly with Mr Smart about his potential to get a transplant off the cadaver list explaining to him wait time related to his advanced age. I explained to him he is more likely to get a transplant if he can find a living donor. I explained to him he will be assessed every 6 months to be sure he is still healthy enough to remain on the list. I also reiterated to him the importance of his caregiver plan. He states Shae is a very good friend. We spoke of asking his family and friends if anyone will be willing to be worked up for living donation and how to contact the dept. I encouraged him to stay active, maintain a healthy diet, and keep himself in as good a shape as possible.    Note written by Kinsey Banks RN    ===============================================  I was present at the meeting and attest to the decision of the committee.    Madison Tomas MD

## 2017-09-08 NOTE — LETTER
September 8, 2017    Dorian Mccartney  [address]  [place] [address]           Dear Dr. Dorian Mccartney:    Patient: Flakito Smart   MR Number: [id number]   YOB: 1945     Your patient, Flakito Smart, was recently discussed at the [facility] Selection Committee meeting on 9/8/2017. I am happy to inform you that Flakito has been approved for transplantation.  He has met selection criteria for a kidney transplant related to CKD stage 4 secondary to primary diagnosis of Hypertensive Nephrosclerosis. Your patient will be placed on the cadaveric wait list pending final financial approval from insurance company. He has been highly encouraged to speak with his family and friends about living donation.    We appreciate your confidence in allowing us to participate in your patients care.  If you have any questions or concerns, please do not hesitate to contact me.    Sincerely,      Rebecca Moe MD  Medical Director, Kidney & Kidney/Pancreas Transplantation    Cc:  Flakito Smart

## 2017-10-17 ENCOUNTER — TELEPHONE (OUTPATIENT)
Dept: TRANSPLANT | Facility: CLINIC | Age: 72
End: 2017-10-17

## 2017-10-17 DIAGNOSIS — Z76.82 ORGAN TRANSPLANT CANDIDATE: Primary | ICD-10-CM

## 2017-10-17 DIAGNOSIS — Z76.82 AWAITING ORGAN TRANSPLANT STATUS: Primary | ICD-10-CM

## 2017-10-17 NOTE — LETTER
October 17, 2017    [facility]  [place] [address]      Dear Flakito Smart:  MRN: [id number]    Congratulations! As of 10/17/2017, your name has been placed on the cadaveric kidney waiting list at [facility].  Your candidacy for kidney transplant is based on the following criteria: Hypertensive Nephrosclerosis.    If you have any friends or family members interested in donating a kidney to you, please have them call the donor coordinator.  If you are a pre-dialysis patient or if you dialyze at home monthly transplant blood kits will be sent directly to you.  If you dialyze at a dialysis center the kits will be sent to your dialysis unit.      While active on the list, you must keep us notified of any changes in your health status.  Should your medical condition change and transplantation is no longer a viable option, it may be necessary to deactivate your status or to remove you from the UNOS list.    Please notify your coordinator when there are changes to your telephone number, address, insurance coverage, or dialysis unit.  If you have any alternate phone numbers that you would like us to have, please let us know.  Your Listed Transplant Coordinator will be Mason Kilgore RN.  Feel free to call your coordinator at [phone] or [phone] should you have any questions.    The Center for Medicaid Services and the [employer] Network for Organ Sharing requires that we inform any patient placed on our waiting list of information that would impact their ability to receive a transplant.  [facility]s Kidney and Kidney/Pancreas transplant program has a transplant surgeon and physician available 365 days a year, 24 hours a day, and 7 days a week to facilitate organ acceptance, procurement, and implantation, and to address urgent patient issues.  You will be notified in writing of any changes to our key personnel and of any changes to our staffing plan that would impact your ability to receive a  transplant.    Attached is a letter from the [employer] Network for Organ Sharing (UNOS).  It describes the services and information offered to patients by UNOS and the Organ Procurement and Transplant Network.    Again, we congratulate you on your success and look forward to working with you very closely in the future.    Sincerely,    Rebecca Moe M.D.                                                Medical Director, Kidney and Kidney/Pancreas Transplant  [facility] [address] [address]  [phone]    Via in-basket:  Dorian Mccartney MD            OPTN/UNOS: Your Resource for Organ Transplant Information        If you have a question regarding your own medical care, you always should call your transplant center first. However, for general organ transplant-related information, you can call the [employer] for Organ Sharing (UNOS) toll-free patient services line at 1-912.545.9059.    Anyone, including potential transplant candidates, recipients, family members/friends, living donors, and/or donor family members can call this number to:    · talk about organ donation, living donation, how transplant and donation work, the donation process, transplant policies, and transplant/donor information;  · get a free patient information kit with helpful booklets, waiting list and transplant information, and a list of all transplant centers;  · ask questions about the Organ Procurement and Transplantation Network (OPTN) web site (www.optn.transplant.hrsa.gov); the UNOS Web site (www.unos.org); or the UNOS web site for living donors and transplant recipients (www.transplantliving.org);  · learn how UNOS and the OPTN can help you;  · talk about any concerns that you may have with a transplant center and how they perform    UNOS is a not-for-profit organization that provides all of the administrative services for the national OPTN under federal  contract to the Health Resources and Services Administration (HRSA), an agency under the U.S. Department of Health and Human Services (HHS).     UNOS and OPTN responsibilities include:    · writing educational material for patients, the public and professionals;  · helping to make people aware of the need for donated organs and tissue;  · writing organ transplant policy with help from doctors, nurses, transplant patients/candidates, donor families, living donors, and the public;  · coordinating the organ matching and placement process;  · collecting information about every organ transplant and donation that occurs in the [place].    Remember, you should contact your transplant center directly if you have questions or concerns about your own medical care including medical records, work-up progress and test reports. [facility] is not your transplant center, and staff at [facility] will not be able to transfer you to your transplant center, so keep your transplant centers phone number handy. But, while you research your transplant needs and learn as much as you can about transplantation and donation, we welcome your call to our toll-free patient services line at [phone].

## 2017-10-17 NOTE — TELEPHONE ENCOUNTER
KIDNEY WAIT LISTING NOTE    Date of Financial clearance to list: 10/17/17    SSN/[facility]:     Organ: Kidney  Name:       Flakito Smart   : 1945          Gender:     male    MRN#: [id number]                                 State of Permanent Residence:  [address] [place] [id number]  Ethnicity: /Black   Race:      Black or     CLINICAL INFORMATION   Candidate Medical Urgency Status: Active  Number of Previous Kidney Transplants: 0  Number of Previous Solid Organ Transplants: 0  Did you enter number of previous kidney or other solid organ transplants? yes  Is this Candidate a Prior Living Donor: no  (If yes, please generate letter to UNOS with patient's date of donation, recipient SSN, signed by Surgical Director after patient is listed in order to receive priority points).      ABO  ABO Blood Group:   A POS     ABO Confirmation: (THESE DATES MUST BE PRIOR TO THE LIST DATE AND SUPPORTED BY SEPARATE LAB REPORTS)    Internal Results    Lab Results   Component Value Date    GROUPTRH A POS 2017    GROUPTRH A POS 2017     No results found for: ABO    External Results    ABO Date 1:    ABO Date 2  Are either of these ABO results based on External Labs? no  (If Yes, STOP and go to source document in Media Tab for verification).    VITALS  Height:  5'9  Weight: 80.8 kg   (Use height from Transplant clinic visits only).  Did you enter height/weight? Yes    HLA    Class I:  Lab Results   Component Value Date    LHRL4GO 3 2017    AGPX9ZC 30 2017    GKMJ3DQ 53 2017    QCVB2TD 81 2017    LLMNK0NY 4 2017    UJDAR2QI 6 2017    MDKQW3WE 4 2017    DJOIC5FZ 18 2017       Class II:  Lab Results   Component Value Date    JZQRMZ12AA 8 2017    KSZPEZ57BB 13 2017    NYOYSQ473CL 52 2017    [id number] XX 2017    DBXYF2BZ 7 2017    GDOCD9DU 6 2017       Tested for HLA Antibodies: Yes, no  "antibodies detected     If result is "Positive" antibodies are detected     If result is "Negative or questionable" no antibodies detected    Lab Results   Component Value Date    CIPRAS Negative 07/12/2017    CIIPRAS Negative 07/12/2017       DIALYSIS INFORMATION  Is patient Pre-Dialysis: Yes     Report GFR being used as the criteria for placement on the kidney list. If not, leave blank  GFR < or = 20 ml/min? Yes  If Yes, Specify value  _17.2__   ml/min     Initial date GFR became 20 or less: 7/12/2017  Is GFR obtained from an Outside lab Result? No  (If YES verify with source document scanned into media)    If patient on Dialysis:    Is candidate currently on dialysis for ESRD? No  If Yes,  Date Chronic Dialysis Started:   (Not currently on dialysis)  (verify with source document in Media Tab)   Dialysis Unit Name: (Not currently on dialysis)                        Physician Name:  Dr. Rebecca Hartman  NPI#: [id number]    DIABETES INFORMATION  Primary Native Kidney Diagnosis: Hypertensive Nephrosclerosis  C-Peptide Value - No results found for: CPEPTIDE  Current Diabetes Status: None    FOR NON-KIDNEY DEPARTMENT USE ONLY:  Additional Organs Registered? none    Maximum Acceptable Number of HLA Mismatches  ABDR:     6      (0-6)               AB:               (0-4)  ADR:   _____  (0-4)              BDR: _____ (0-4)  A:        _____  (0-2)              B:      _____ (0-2)          DR: ______ (0-2)    Will Recipient Accept?   Accept HBcAB Positive Organ:            Yes  Accept HBV PRETTY Positive Organ:        no  Accept HCV Antibody Positive Organ: no   Accept HCV PRETTY Positive Organ: no  Accept KDPI > 85 Donor ?: No                        Local: No                           Import: No    ### NURSE TO VERIFY CONSENT AND MAKE ANY NECESSARY CHANGES NEEDED IN UNET AT THE TIME OF VERIFICATION ###    Unacceptible Antigens  If yes, list     No results found for: LW8OQZU, CIABCLM, CIIAB, ABCMT    ### DO NOT LIST IF ANTIGEN " VALUE WEAK ###    ### DO NOT LIST ANTIGEN AS UNACCEPTABLE IF IT HAS * OR : IN THE RESULT  VALUE ###

## 2017-10-18 DIAGNOSIS — Z76.82 AWAITING ORGAN TRANSPLANT STATUS: Primary | ICD-10-CM

## 2017-10-25 ENCOUNTER — LAB VISIT (OUTPATIENT)
Dept: LAB | Facility: HOSPITAL | Age: 72
End: 2017-10-25
Attending: INTERNAL MEDICINE
Payer: MEDICARE

## 2017-10-25 DIAGNOSIS — Z76.82 AWAITING ORGAN TRANSPLANT STATUS: ICD-10-CM

## 2017-10-25 PROCEDURE — 86833 HLA CLASS II HIGH DEFIN QUAL: CPT | Mod: TXP

## 2017-10-25 PROCEDURE — 86832 HLA CLASS I HIGH DEFIN QUAL: CPT | Mod: TXP

## 2017-11-06 ENCOUNTER — LAB VISIT (OUTPATIENT)
Dept: LAB | Facility: HOSPITAL | Age: 72
End: 2017-11-06
Attending: INTERNAL MEDICINE
Payer: MEDICARE

## 2017-11-06 DIAGNOSIS — Z76.82 ORGAN TRANSPLANT CANDIDATE: ICD-10-CM

## 2017-11-06 PROCEDURE — 99001 SPECIMEN HANDLING PT-LAB: CPT | Mod: TXP

## 2017-11-15 LAB — HPRA INTERPRETATION: NORMAL

## 2017-11-20 LAB
CLASS I ANTIBODY COMMENTS - LUMINEX: NORMAL
CLASS II ANTIBODIES - LUMINEX: NEGATIVE
CPRA %: 0
SERUM COLLECTION DT - LUMINEX CLASS I: NORMAL
SERUM COLLECTION DT - LUMINEX CLASS II: NORMAL
SPCL1 TESTING DATE: NORMAL
SPCL2 TESTING DATE: NORMAL
SPCLU TESTING DATE: NORMAL

## 2017-11-24 LAB
HLA FREEZE AND HOLD INTERPRETATION: NORMAL
HPRA INTERPRETATION: NORMAL

## 2017-12-01 NOTE — PROGRESS NOTES
YEARLY LIST MANAGEMENT NOTE    [facility]'s kidney transplant listing status reviewed.  Patient is due for follow-up appointments in January 2018.  Appointments will be scheduled per protocol.  HLA sample is current and being rec'd on a regular basis.

## 2017-12-04 ENCOUNTER — LAB VISIT (OUTPATIENT)
Dept: LAB | Facility: HOSPITAL | Age: 72
End: 2017-12-04
Attending: INTERNAL MEDICINE
Payer: MEDICARE

## 2017-12-04 DIAGNOSIS — Z76.82 AWAITING ORGAN TRANSPLANT STATUS: ICD-10-CM

## 2017-12-04 PROCEDURE — 99001 SPECIMEN HANDLING PT-LAB: CPT | Mod: TXP

## 2017-12-20 LAB
HLA FREEZE AND HOLD INTERPRETATION: NORMAL
HLAFH COLLECTION DATE: NORMAL
HPRA INTERPRETATION: NORMAL

## 2018-01-08 ENCOUNTER — LAB VISIT (OUTPATIENT)
Dept: LAB | Facility: HOSPITAL | Age: 73
End: 2018-01-08
Attending: INTERNAL MEDICINE
Payer: MEDICARE

## 2018-01-08 DIAGNOSIS — Z76.82 AWAITING ORGAN TRANSPLANT STATUS: ICD-10-CM

## 2018-01-08 PROCEDURE — 86832 HLA CLASS I HIGH DEFIN QUAL: CPT | Mod: TXP

## 2018-01-08 PROCEDURE — 86833 HLA CLASS II HIGH DEFIN QUAL: CPT | Mod: TXP

## 2018-01-11 DIAGNOSIS — Z76.82 AWAITING ORGAN TRANSPLANT STATUS: Primary | ICD-10-CM

## 2018-02-03 LAB — HPRA INTERPRETATION: NORMAL

## 2018-02-05 ENCOUNTER — LAB VISIT (OUTPATIENT)
Dept: LAB | Facility: HOSPITAL | Age: 73
End: 2018-02-05
Attending: INTERNAL MEDICINE
Payer: MEDICARE

## 2018-02-05 DIAGNOSIS — Z76.82 AWAITING ORGAN TRANSPLANT STATUS: ICD-10-CM

## 2018-02-05 PROCEDURE — 99001 SPECIMEN HANDLING PT-LAB: CPT | Mod: TXP

## 2018-03-05 ENCOUNTER — LAB VISIT (OUTPATIENT)
Dept: LAB | Facility: HOSPITAL | Age: 73
End: 2018-03-05
Attending: INTERNAL MEDICINE
Payer: MEDICARE

## 2018-03-05 DIAGNOSIS — Z76.82 AWAITING ORGAN TRANSPLANT STATUS: ICD-10-CM

## 2018-03-05 PROCEDURE — 99001 SPECIMEN HANDLING PT-LAB: CPT | Mod: TXP

## 2018-04-04 DIAGNOSIS — Z76.82 AWAITING ORGAN TRANSPLANT STATUS: Primary | ICD-10-CM

## 2018-04-13 DIAGNOSIS — Z76.82 ORGAN TRANSPLANT CANDIDATE: Primary | ICD-10-CM

## 2018-05-07 ENCOUNTER — HOSPITAL ENCOUNTER (OUTPATIENT)
Dept: RADIOLOGY | Facility: HOSPITAL | Age: 73
Discharge: HOME | End: 2018-05-07
Attending: INTERNAL MEDICINE
Payer: MEDICARE

## 2018-05-07 DIAGNOSIS — Z76.82 ORGAN TRANSPLANT CANDIDATE: ICD-10-CM

## 2018-05-07 PROCEDURE — 76770 US EXAM ABDO BACK WALL COMP: CPT | Mod: TC,TXP

## 2018-05-07 PROCEDURE — 71046 X-RAY EXAM CHEST 2 VIEWS: CPT | Mod: TC,TXP

## 2018-05-07 PROCEDURE — 71046 X-RAY EXAM CHEST 2 VIEWS: CPT | Mod: 26,TXP,, | Performed by: RADIOLOGY

## 2018-05-07 PROCEDURE — 76770 US EXAM ABDO BACK WALL COMP: CPT | Mod: 26,TXP,, | Performed by: RADIOLOGY

## 2018-06-26 DIAGNOSIS — Z76.82 ORGAN TRANSPLANT CANDIDATE: Primary | ICD-10-CM

## 2018-11-30 ENCOUNTER — COMMITTEE REVIEW (OUTPATIENT)
Dept: TRANSPLANT | Facility: CLINIC | Age: 73
End: 2018-11-30

## 2018-11-30 NOTE — COMMITTEE REVIEW
Native Organ Dx: Hypertensive Nephrosclerosis      The above patient case previously discussed with Dr. Penn and formally presented to Selection Committee today.  Proceed with removing patient from the kidney transplant waiting list due to failure to communicate with the transplant center.  Patient with multiple missed/cancelled appointments resulting in an out of date transplant evaluation.      Note written by:  Mason Kilgore Jr.    ===============================================    I was present at the meeting and attest to the decision of the committee.    Madison Tomas MD

## 2018-12-04 ENCOUNTER — TELEPHONE (OUTPATIENT)
Dept: TRANSPLANT | Facility: CLINIC | Age: 73
End: 2018-12-04

## 2018-12-04 NOTE — TELEPHONE ENCOUNTER
Phoned cell.  Attempting to contact patient regarding removal from the waiting list.  Left message on cell phone voice mail for patient to call me.

## 2019-03-29 PROBLEM — M89.9 CHRONIC KIDNEY DISEASE-MINERAL AND BONE DISORDER: Status: ACTIVE | Noted: 2019-03-29

## 2019-03-29 PROBLEM — E83.9 CHRONIC KIDNEY DISEASE-MINERAL AND BONE DISORDER: Status: ACTIVE | Noted: 2019-03-29

## 2019-03-29 PROBLEM — N18.6 ESRD (END STAGE RENAL DISEASE): Status: ACTIVE | Noted: 2019-03-29

## 2019-03-29 PROBLEM — E87.5 HYPERKALEMIA: Status: ACTIVE | Noted: 2019-03-29

## 2019-03-29 PROBLEM — N18.9 CHRONIC KIDNEY DISEASE-MINERAL AND BONE DISORDER: Status: ACTIVE | Noted: 2019-03-29

## 2019-03-29 PROBLEM — N18.5 CKD (CHRONIC KIDNEY DISEASE), STAGE V: Status: ACTIVE | Noted: 2019-03-29

## 2019-03-29 PROBLEM — D63.8 ANEMIA OF CHRONIC DISEASE: Status: ACTIVE | Noted: 2019-03-29

## 2019-03-30 PROBLEM — N25.81 SECONDARY HYPERPARATHYROIDISM: Status: ACTIVE | Noted: 2019-03-30

## 2019-03-30 PROBLEM — E87.5 HYPERKALEMIA: Status: RESOLVED | Noted: 2019-03-29 | Resolved: 2019-03-30

## 2019-03-30 PROBLEM — E83.39 HYPERPHOSPHATEMIA: Status: ACTIVE | Noted: 2019-03-30

## 2019-04-24 PROBLEM — I48.91 ATRIAL FIBRILLATION WITH RAPID VENTRICULAR RESPONSE: Status: ACTIVE | Noted: 2019-04-24

## 2019-04-25 PROBLEM — D64.9 ANEMIA: Status: ACTIVE | Noted: 2019-03-29

## 2019-06-14 PROBLEM — R94.39 ABNORMAL MYOCARDIAL PERFUSION STUDY: Status: ACTIVE | Noted: 2019-06-14

## 2019-06-14 PROBLEM — R93.1 ELEVATED CORONARY ARTERY CALCIUM SCORE: Status: ACTIVE | Noted: 2019-06-14

## 2019-08-14 PROBLEM — I47.10 SVT (SUPRAVENTRICULAR TACHYCARDIA): Status: ACTIVE | Noted: 2019-08-14

## 2019-09-19 ENCOUNTER — TELEPHONE (OUTPATIENT)
Dept: TRANSPLANT | Facility: CLINIC | Age: 74
End: 2019-09-19

## 2019-09-19 NOTE — TELEPHONE ENCOUNTER
----- Message from Jay Ardon MD sent at 9/19/2019  8:15 AM CDT -----  Please send any requests to my office [phone]. Thank you  ----- Message -----  From: Erin Lane RN  Sent: 9/18/2019  12:03 PM  To: MD Dr Reva Delgado,      The above patient has been refereed for kidney transplant evaluation. He had an angio done 6/14/19 with 3 PARADISE. How long will the patient need to be anticoagulants? This will help determine if he is currently a candidate for kidney transplant evaluation.     Thanks in Advance,   Erin Lane RN

## 2019-11-04 ENCOUNTER — DOCUMENTATION ONLY (OUTPATIENT)
Dept: TRANSPLANT | Facility: CLINIC | Age: 74
End: 2019-11-04

## 2019-11-12 ENCOUNTER — TELEPHONE (OUTPATIENT)
Dept: TRANSPLANT | Facility: CLINIC | Age: 74
End: 2019-11-12

## 2019-11-12 NOTE — TELEPHONE ENCOUNTER
Nurse spoke with Aliyah and informed her that we will need clarification regarding PARADISE x 3. Capri states she will pass the information on to the cardiologist for clearance. All questions answered. Aliyah verbalized understanding of the above information.

## 2019-11-12 NOTE — TELEPHONE ENCOUNTER
----- Message from Shilpa Blair sent at 11/12/2019  2:37 PM CST -----  Contact: Aliyah roman / [facility]  You have this chart in the wire rack -   ----- Message -----  From: Breanna Alcaraz  Sent: 11/11/2019   3:26 PM CST  To: Shilpa Blair        ----- Message -----  From: Andrea Fontana  Sent: 11/11/2019   2:54 PM CST  To: [facility] Pre-Kidney Transplant Non-Clinical    Aliyah was calling to get more info about pt kidney referral.    Aliyah# [phone]